# Patient Record
Sex: MALE | Race: BLACK OR AFRICAN AMERICAN | NOT HISPANIC OR LATINO | Employment: FULL TIME | URBAN - METROPOLITAN AREA
[De-identification: names, ages, dates, MRNs, and addresses within clinical notes are randomized per-mention and may not be internally consistent; named-entity substitution may affect disease eponyms.]

---

## 2018-04-11 ENCOUNTER — TELEPHONE (OUTPATIENT)
Dept: NEUROLOGY | Facility: CLINIC | Age: 28
End: 2018-04-11

## 2018-04-11 NOTE — TELEPHONE ENCOUNTER
----- Message from Leon Aj sent at 4/11/2018 11:37 AM CDT -----  Contact: Self @ [phone]  Pt is asking we send a medical records request to [facility], for appt on 04/16. [facility] fax to [phone].

## 2019-06-18 ENCOUNTER — HOSPITAL ENCOUNTER (EMERGENCY)
Facility: HOSPITAL | Age: 29
Discharge: HOME | End: 2019-06-18
Attending: EMERGENCY MEDICINE

## 2019-06-18 VITALS
WEIGHT: 150 LBS | SYSTOLIC BLOOD PRESSURE: 174 MMHG | HEART RATE: 88 BPM | OXYGEN SATURATION: 99 % | DIASTOLIC BLOOD PRESSURE: 85 MMHG | RESPIRATION RATE: 18 BRPM | HEIGHT: 67 IN | BODY MASS INDEX: 23.54 KG/M2 | TEMPERATURE: 99 F

## 2019-06-18 DIAGNOSIS — L02.416 ABSCESS OF LEFT THIGH: Primary | ICD-10-CM

## 2019-06-18 DIAGNOSIS — L02.429 BOIL, THIGH: ICD-10-CM

## 2019-06-18 PROCEDURE — 10060 I&D ABSCESS SIMPLE/SINGLE: CPT

## 2019-06-18 PROCEDURE — 10060 I&D ABSCESS SIMPLE/SINGLE: CPT | Mod: LT,,, | Performed by: EMERGENCY MEDICINE

## 2019-06-18 PROCEDURE — 10060 PR DRAIN SKIN ABSCESS SIMPLE: ICD-10-PCS | Mod: LT,,, | Performed by: EMERGENCY MEDICINE

## 2019-06-18 PROCEDURE — 99284 EMERGENCY DEPT VISIT MOD MDM: CPT | Mod: 25,,, | Performed by: EMERGENCY MEDICINE

## 2019-06-18 PROCEDURE — 25000003 PHARM REV CODE 250: Performed by: EMERGENCY MEDICINE

## 2019-06-18 PROCEDURE — 99284 PR EMERGENCY DEPT VISIT,LEVEL IV: ICD-10-PCS | Mod: 25,,, | Performed by: EMERGENCY MEDICINE

## 2019-06-18 PROCEDURE — 99283 EMERGENCY DEPT VISIT LOW MDM: CPT | Mod: 25

## 2019-06-18 RX ORDER — LIDOCAINE HYDROCHLORIDE 10 MG/ML
5 INJECTION, SOLUTION EPIDURAL; INFILTRATION; INTRACAUDAL; PERINEURAL
Status: COMPLETED | OUTPATIENT
Start: 2019-06-18 | End: 2019-06-18

## 2019-06-18 RX ORDER — SULFAMETHOXAZOLE AND TRIMETHOPRIM 800; 160 MG/1; MG/1
1 TABLET ORAL 2 TIMES DAILY
Qty: 14 TABLET | Refills: 0 | Status: SHIPPED | OUTPATIENT
Start: 2019-06-18 | End: 2019-06-25

## 2019-06-18 RX ORDER — DIVALPROEX SODIUM 125 MG/1
125 CAPSULE, COATED PELLETS ORAL 2 TIMES DAILY
COMMUNITY
End: 2020-02-06 | Stop reason: CLARIF

## 2019-06-18 RX ORDER — IBUPROFEN 600 MG/1
600 TABLET ORAL
Status: COMPLETED | OUTPATIENT
Start: 2019-06-18 | End: 2019-06-18

## 2019-06-18 RX ADMIN — IBUPROFEN 600 MG: 600 TABLET, FILM COATED ORAL at 09:06

## 2019-06-18 RX ADMIN — LIDOCAINE HYDROCHLORIDE 50 MG: 10 INJECTION, SOLUTION EPIDURAL; INFILTRATION; INTRACAUDAL; PERINEURAL at 09:06

## 2019-06-18 NOTE — ED NOTES
Patient identifiers verified and correct for Marcellus Goodwin  LOC: The patient is awake, alert and aware of environment with an appropriate affect, the patient is oriented x 3 and speaking appropriately.   APPEARANCE: Patient appears comfortable and in no acute distress, patient is clean and well groomed.  SKIN: The skin is warm and dry, color consistent with ethnicity, patient has normal skin turgor and moist mucus membranes, skin intact, no breakdown or bruising noted. Abscess to L upper leg  MUSCULOSKELETAL: Patient moving all extremities spontaneously, no swelling noted.   RESPIRATORY: Airway is open and patent, respirations are spontaneous, patient has a normal effort and rate, no accessory muscle use noted, O2 sats noted at 99% on room air.  CARDIAC: Denies chest pain capillary refill < 3 seconds.   GASTRO: Soft and non tender to palpation, no distention noted, normoactive bowel sounds present in all four quadrants. Pt states bowel movements have been regular.  : Pt denies any pain or frequency with urination.  NEURO: Pt opens eyes spontaneously, behavior appropriate to situation, follows commands, facial expression symmetrical, bilateral hand grasp equal and even, purposeful motor response noted, normal sensation in all extremities when touched with a finger.

## 2019-06-18 NOTE — ED PROVIDER NOTES
Encounter Date: 6/18/2019    SCRIBE #1 NOTE: I, Yue Durbin, am scribing for, and in the presence of,  Dr. Cline. I have scribed the following portions of the note - Other sections scribed: HPI, ROS, PE.       History     Chief Complaint   Patient presents with    Abscess     L upper thigh, pain going from L leg down    Leg Pain     Time patient was seen by the provider: 9:11 AM      The patient is a 28 y.o. male with history of smoking who presents to the ED with a complaint of abscess. Patient reports he woke up with an abscess on his left inner thigh 4 days ago and states since then it has been draining. He endorses pain radiating from the area of the abscess down to his left knee. He denies fever or chills. Patient has no additional complaints at this time.  Onset has been gradual, associated with abscess collection, with small amount of drainage with fluctuance and surrounding pain. He has surrounding cellulitis as well. Denies any other constitutional symptoms, nausea, vomiting, fever, groin pain, back pain, neck pain. Current pain scale 10/10 located to the direct abscess site.    The history is provided by the patient and medical records.     Review of patient's allergies indicates:  No Known Allergies  Past Medical History:   Diagnosis Date    Generalized headaches     Seizures      History reviewed. No pertinent surgical history.  Family History   Problem Relation Age of Onset    Cancer Father         colon cancer    Alcohol abuse Father     Cancer Maternal Aunt     Cancer Paternal Aunt     Arthritis Maternal Grandmother     Vision loss Maternal Grandfather      Social History     Tobacco Use    Smoking status: Current Every Day Smoker     Types: Cigarettes   Substance Use Topics    Alcohol use: No    Drug use: Yes     Types: Marijuana     Review of Systems   Constitutional: Negative for chills and fever.   HENT: Negative for sore throat.    Respiratory: Negative for shortness of breath.     Cardiovascular: Negative for chest pain.   Gastrointestinal: Negative for nausea.   Genitourinary: Negative for dysuria.   Musculoskeletal: Negative for back pain.   Skin: Positive for wound (abscess to left inner thigh). Negative for rash.   Neurological: Negative for weakness.   Hematological: Does not bruise/bleed easily.       Physical Exam     Initial Vitals [06/18/19 0856]   BP Pulse Resp Temp SpO2   (!) 174/85 88 18 98.6 °F (37 °C) 99 %      MAP       --         Physical Exam    Nursing note and vitals reviewed.    Gen/Constitutional: Interactive. No acute distress  Head: Normocephalic, Atraumatic  Neck: supple, no masses or LAD  Eyes: PERRLA, conjunctiva clear  Ears, Nose and Throat: No rhinorrhea or stridor.  Cardiac: Reg Rhythm, No murmur  Pulmonary: CTA Bilat, no wheezes, rhonchi, rales.  GI: Abdomen soft, non-tender, non-distended; no rebound or guarding  : No CVA tenderness.  Musculoskeletal: Extremities warm, well perfused, no erythema, no edema  Skin: No rashes, left inner thigh has a 3 x 4 cm area of induration, fluctuance and surrounding cellulitis.  It appears to be open at the top with small amount of purulent discharge.  Neuro: Alert and Oriented x 3; No focal motor or sensory deficits.    Psych: Normal affect     ED Course   I & D - Incision and Drainage  Date/Time: 6/18/2019 9:26 AM  Performed by: Karlo Cline DO  Authorized by: Karlo Cline DO   Consent Done: Not Needed  Type: abscess  Body area: lower extremity  Location details: left leg  Anesthesia: local infiltration    Anesthesia:  Local Anesthetic: lidocaine 1% without epinephrine  Anesthetic total: 3 mL  Scalpel size: 11  Incision type: single straight  Complexity: simple  Drainage: pus  Drainage amount: moderate  Wound treatment: wound packed and  wound left open  Packing material: 1/2 in gauze  Patient tolerance: Patient tolerated the procedure well with no immediate complications        Labs Reviewed - No data to  display       Imaging Results    None          Medical Decision Making:   History:   Old Medical Records: I decided to obtain old medical records.  Initial Assessment:   28 y.o. male with history of smoking presenting with abscess of left thigh.  Differential Diagnosis:   Differential diagnosis includes but is not limited to:  Abscess, cellulitis, necrotizing fasciitis, deep space infection.  Independently Interpreted Test(s):   I have ordered and independently interpreted X-rays - see summary below.       <> Summary of X-Ray Reading(s): Bedside ultrasound    Emergent evaluation of patient presenting with a boil/abscess to the left inner thigh.  He is afebrile, vital signs are stable. Physical exam findings remarkable for a 3 x 4 cm area of induration associated with fluctuance, surrounding cellulitis and a small opening with purulent discharge. Bedside ultrasound confirms a fluid/abscess pocket.  Per procedure note above, the wound was opened up completely with a 1 cm stab incision to allow purulent drainage. The area was packed with iodoform packing, with sterile dressing placed.  Given surrounding cellulitis, placed on short course of antibiotics with plans to follow up with PCP as needed.  Wound care instructions given, strict ED precautions return instructions discussed at length.  Do not suspect necrotizing fasciitis, abscess, deep infection or groin infection. Patient agreeable to discharge plan. Strict ED precautions and return instructions discussed at length and patient verbalized understanding. All questions were answered and ample time was given for questions.      Complexity:  High - procedural          Scribe Attestation:   Scribe #1: I performed the above scribed service and the documentation accurately describes the services I performed. I attest to the accuracy of the note.    I, Dr. Karlo Cline, personally performed the services described in this documentation. All medical record entries made  by the scribe were at my direction and in my presence.  I have reviewed the chart and agree that the record reflects my personal performance and is accurate and complete.              Clinical Impression:       ICD-10-CM ICD-9-CM   1. Abscess of left thigh L02.416 682.6   2. Boil, thigh L02.439 680.6         Disposition:   Disposition: Discharged  Condition: Stable         Karlo Cline DO  Dept of Emergency Medicine   [facility]  Spectralink: [id number]                Karlo Cline DO  06/18/19 1201

## 2019-06-18 NOTE — ED TRIAGE NOTES
Patient reports to the ED today with reports on an abscess to L upper leg onset 4 days ago. Patient states that his R leg hurts all the way down to his foot

## 2019-06-18 NOTE — DISCHARGE INSTRUCTIONS
Take antibiotics as prescribed.     Leave packing in for 24-36 hr, you may remove and then wash and shower daily and keep covered.  Avoid pools, lakes, baths until completely healed.  Follow-up with a primary care as needed.  You may establish care with a primary care doctor at [facility].

## 2019-07-15 ENCOUNTER — HOSPITAL ENCOUNTER (EMERGENCY)
Facility: HOSPITAL | Age: 29
Discharge: HOME | End: 2019-07-15
Attending: EMERGENCY MEDICINE

## 2019-07-15 VITALS
RESPIRATION RATE: 20 BRPM | HEIGHT: 66 IN | WEIGHT: 160 LBS | HEART RATE: 71 BPM | BODY MASS INDEX: 25.71 KG/M2 | DIASTOLIC BLOOD PRESSURE: 67 MMHG | TEMPERATURE: 99 F | SYSTOLIC BLOOD PRESSURE: 125 MMHG | OXYGEN SATURATION: 96 %

## 2019-07-15 DIAGNOSIS — I51.7 LEFT VENTRICULAR HYPERTROPHY BY ELECTROCARDIOGRAM: ICD-10-CM

## 2019-07-15 DIAGNOSIS — R56.9 SEIZURE: ICD-10-CM

## 2019-07-15 DIAGNOSIS — G40.401 OTHER GENERALIZED EPILEPSY, NOT INTRACTABLE, WITH STATUS EPILEPTICUS: Primary | ICD-10-CM

## 2019-07-15 LAB
ALBUMIN SERPL BCP-MCNC: 3.6 G/DL (ref 3.5–5.2)
ALP SERPL-CCNC: 69 U/L (ref 55–135)
ALT SERPL W/O P-5'-P-CCNC: 13 U/L (ref 10–44)
ANION GAP SERPL CALC-SCNC: 9 MMOL/L (ref 8–16)
AST SERPL-CCNC: 16 U/L (ref 10–40)
BASOPHILS # BLD AUTO: 0.03 K/UL (ref 0–0.2)
BASOPHILS NFR BLD: 0.4 % (ref 0–1.9)
BILIRUB SERPL-MCNC: 0.6 MG/DL (ref 0.1–1)
BILIRUB UR QL STRIP: NEGATIVE
BUN SERPL-MCNC: 5 MG/DL (ref 6–20)
CALCIUM SERPL-MCNC: 9.5 MG/DL (ref 8.7–10.5)
CHLORIDE SERPL-SCNC: 107 MMOL/L (ref 95–110)
CLARITY UR REFRACT.AUTO: CLEAR
CO2 SERPL-SCNC: 25 MMOL/L (ref 23–29)
COLOR UR AUTO: YELLOW
CREAT SERPL-MCNC: 0.7 MG/DL (ref 0.5–1.4)
DIFFERENTIAL METHOD: ABNORMAL
EOSINOPHIL # BLD AUTO: 0 K/UL (ref 0–0.5)
EOSINOPHIL NFR BLD: 0.5 % (ref 0–8)
ERYTHROCYTE [DISTWIDTH] IN BLOOD BY AUTOMATED COUNT: 14.2 % (ref 11.5–14.5)
EST. GFR  (AFRICAN AMERICAN): >60 ML/MIN/1.73 M^2
EST. GFR  (NON AFRICAN AMERICAN): >60 ML/MIN/1.73 M^2
GLUCOSE SERPL-MCNC: 81 MG/DL (ref 70–110)
GLUCOSE UR QL STRIP: NEGATIVE
HCT VFR BLD AUTO: 43.7 % (ref 40–54)
HGB BLD-MCNC: 13.7 G/DL (ref 14–18)
HGB UR QL STRIP: NEGATIVE
IMM GRANULOCYTES # BLD AUTO: 0.03 K/UL (ref 0–0.04)
IMM GRANULOCYTES NFR BLD AUTO: 0.4 % (ref 0–0.5)
KETONES UR QL STRIP: ABNORMAL
LEUKOCYTE ESTERASE UR QL STRIP: NEGATIVE
LYMPHOCYTES # BLD AUTO: 2.5 K/UL (ref 1–4.8)
LYMPHOCYTES NFR BLD: 29.8 % (ref 18–48)
MCH RBC QN AUTO: 29.3 PG (ref 27–31)
MCHC RBC AUTO-ENTMCNC: 31.4 G/DL (ref 32–36)
MCV RBC AUTO: 94 FL (ref 82–98)
MONOCYTES # BLD AUTO: 0.5 K/UL (ref 0.3–1)
MONOCYTES NFR BLD: 5.5 % (ref 4–15)
NEUTROPHILS # BLD AUTO: 5.3 K/UL (ref 1.8–7.7)
NEUTROPHILS NFR BLD: 63.4 % (ref 38–73)
NITRITE UR QL STRIP: NEGATIVE
NRBC BLD-RTO: 0 /100 WBC
PH UR STRIP: 7 [PH] (ref 5–8)
PLATELET # BLD AUTO: 199 K/UL (ref 150–350)
PMV BLD AUTO: 10.1 FL (ref 9.2–12.9)
POTASSIUM SERPL-SCNC: 3.7 MMOL/L (ref 3.5–5.1)
PROT SERPL-MCNC: 7.2 G/DL (ref 6–8.4)
PROT UR QL STRIP: NEGATIVE
RBC # BLD AUTO: 4.67 M/UL (ref 4.6–6.2)
SODIUM SERPL-SCNC: 141 MMOL/L (ref 136–145)
SP GR UR STRIP: 1.02 (ref 1–1.03)
URN SPEC COLLECT METH UR: ABNORMAL
VALPROATE SERPL-MCNC: 137.8 UG/ML (ref 50–100)
WBC # BLD AUTO: 8.29 K/UL (ref 3.9–12.7)

## 2019-07-15 PROCEDURE — 99285 PR EMERGENCY DEPT VISIT,LEVEL V: ICD-10-PCS | Mod: ,,, | Performed by: EMERGENCY MEDICINE

## 2019-07-15 PROCEDURE — 99285 EMERGENCY DEPT VISIT HI MDM: CPT | Mod: ,,, | Performed by: EMERGENCY MEDICINE

## 2019-07-15 PROCEDURE — 25000003 PHARM REV CODE 250: Performed by: STUDENT IN AN ORGANIZED HEALTH CARE EDUCATION/TRAINING PROGRAM

## 2019-07-15 PROCEDURE — 80164 ASSAY DIPROPYLACETIC ACD TOT: CPT

## 2019-07-15 PROCEDURE — 85025 COMPLETE CBC W/AUTO DIFF WBC: CPT

## 2019-07-15 PROCEDURE — 99285 EMERGENCY DEPT VISIT HI MDM: CPT

## 2019-07-15 PROCEDURE — 80053 COMPREHEN METABOLIC PANEL: CPT

## 2019-07-15 PROCEDURE — 93005 ELECTROCARDIOGRAM TRACING: CPT

## 2019-07-15 PROCEDURE — 93010 EKG 12-LEAD: ICD-10-PCS | Mod: ,,, | Performed by: INTERNAL MEDICINE

## 2019-07-15 PROCEDURE — 81003 URINALYSIS AUTO W/O SCOPE: CPT

## 2019-07-15 PROCEDURE — 93010 ELECTROCARDIOGRAM REPORT: CPT | Mod: ,,, | Performed by: INTERNAL MEDICINE

## 2019-07-15 RX ORDER — ZONISAMIDE 100 MG/1
300 CAPSULE ORAL ONCE
Status: COMPLETED | OUTPATIENT
Start: 2019-07-15 | End: 2019-07-15

## 2019-07-15 RX ORDER — ALPRAZOLAM 2 MG/1
2 TABLET ORAL NIGHTLY PRN
COMMUNITY
Start: 2017-10-20 | End: 2024-01-25

## 2019-07-15 RX ORDER — ALPRAZOLAM 0.5 MG/1
2 TABLET ORAL
Status: COMPLETED | OUTPATIENT
Start: 2019-07-15 | End: 2019-07-15

## 2019-07-15 RX ORDER — ZONISAMIDE 100 MG/1
300 CAPSULE ORAL NIGHTLY
COMMUNITY
Start: 2017-06-15 | End: 2022-12-12

## 2019-07-15 RX ADMIN — ZONISAMIDE 300 MG: 100 CAPSULE ORAL at 03:07

## 2019-07-15 RX ADMIN — ALPRAZOLAM 2 MG: 0.5 TABLET ORAL at 02:07

## 2019-07-15 NOTE — ED NOTES
Called into patients room, patient states he feels a little shaky and that he feels like he is going to have a panic attack, patient states he is also seeing black spots, patient states having a panic attack sometimes causes a seizure, patient states he takes xanax twice daily for this reason, made physician aware, vss, will continue to monitor

## 2019-07-15 NOTE — ED PROVIDER NOTES
Encounter Date: 7/15/2019       History     Chief Complaint   Patient presents with    Seizures     had one 2 days ago and had one this morning. Reports compliance with depakote. AAOX3     28 y.o. male with PMH of epilepsy presenting with 2 seizures over last 3 days. Pt has had epilepsy since 2011 and had a witnessed seizure 3 days ago while he was sleeping, where he reportedly sat up in bed, biting his tongue, foaming at the mouth, cannot endorse how long it occurred because he lost consciousness during it. Pt endorses having another seizure this AM while he was sleeping similar in presentation to his recent episode. He was concerned since he doesn't have a neurologist here since he moved here from [place] and came into ED. Pt endorses having a few seizures a month while sleeping similar in presentation and denies them getting longer, more intense, or more frequent. Patient denies any head trauma or recent falls. Today he complains of mild photophobia and frontal headache which he endorses usually occur after his seizures. Endorses trouble eating or drinking anything past 2 days due to his tongue pain. He takes Depakote 1000mg BID and Xanax 2mg BID, and endorses medication compliance, but denies taking his medications today. Denies recent illnesses, changes to his diet or medications. Denies fever/chills, chest pain, SOB, N/V, Abd pain, bowel/bladder changes, or numbness in any of his extremities. Last EEG was 2011. Tried Dilantin in the past, but states that it doesn't work. Brought his depakote with him.     Prior L inner thigh abscess has resolved according to pt.           Review of patient's allergies indicates:  No Known Allergies  Past Medical History:   Diagnosis Date    Generalized headaches     Seizures      No past surgical history on file.  Family History   Problem Relation Age of Onset    Cancer Father         colon cancer    Alcohol abuse Father     Cancer Maternal Aunt     Cancer Paternal  Aunt     Arthritis Maternal Grandmother     Vision loss Maternal Grandfather      Social History     Tobacco Use    Smoking status: Current Every Day Smoker     Types: Cigarettes   Substance Use Topics    Alcohol use: No    Drug use: Yes     Types: Marijuana     Review of Systems   Constitutional: Positive for appetite change (2/2 to tongue pain). Negative for chills and fever.   HENT: Negative for congestion, ear pain, mouth sores, sinus pain, sore throat, tinnitus, trouble swallowing and voice change.    Eyes: Positive for photophobia. Negative for pain and visual disturbance.   Respiratory: Negative for cough, choking and shortness of breath.    Cardiovascular: Negative for chest pain and palpitations.   Gastrointestinal: Negative for abdominal distention, blood in stool, diarrhea, nausea and vomiting.   Endocrine: Negative for polyuria.   Genitourinary: Negative for dysuria and hematuria.   Musculoskeletal: Negative for arthralgias, back pain, myalgias, neck pain and neck stiffness.   Skin: Negative for color change and rash.   Allergic/Immunologic: Negative for immunocompromised state.   Neurological: Positive for seizures and headaches. Negative for syncope, light-headedness and numbness.   Hematological: Does not bruise/bleed easily.   Psychiatric/Behavioral: Negative for confusion and decreased concentration.        Denies drug or ETOH abuse       Physical Exam     Initial Vitals [07/15/19 1152]   BP Pulse Resp Temp SpO2   (!) 167/89 81 16 98.7 °F (37.1 °C) 99 %      MAP       --         Physical Exam    Nursing note and vitals reviewed.  Constitutional: He appears well-developed and well-nourished. He is not diaphoretic. No distress.   HENT:   Head: Normocephalic and atraumatic.   Nose: Nose normal.   Mouth/Throat: Oropharynx is clear and moist.   Tongue with swelling on R. Without active bleeding, dried blood, or ecchymosis. Able to stick out tongue and elevate to roof of mouth with mild pain.     Eyes: Conjunctivae and EOM are normal.   Pupil reactivity difficult to obtain due to photophobia  Pupils are equal and round.    Neck: Normal range of motion. Neck supple.   Cardiovascular: Normal rate, regular rhythm, normal heart sounds and intact distal pulses.   Pulmonary/Chest: Breath sounds normal.   Abdominal: Soft. Bowel sounds are normal.   Musculoskeletal: Normal range of motion.   Healed wound abscess over L inner thigh without drainage, or tenderness.   Neurological: He is alert and oriented to person, place, and time. No cranial nerve deficit or sensory deficit.   Skin: Skin is warm. Capillary refill takes less than 2 seconds.   Psychiatric: He has a normal mood and affect. Thought content normal.         ED Course   Procedures  Labs Reviewed   URINALYSIS, REFLEX TO URINE CULTURE - Abnormal; Notable for the following components:       Result Value    Ketones, UA Trace (*)     All other components within normal limits    Narrative:     Preferred Collection Type->Urine, Clean Catch   CBC W/ AUTO DIFFERENTIAL - Abnormal; Notable for the following components:    Hemoglobin 13.7 (*)     Mean Corpuscular Hemoglobin Conc 31.4 (*)     All other components within normal limits   COMPREHENSIVE METABOLIC PANEL - Abnormal; Notable for the following components:    BUN, Bld 5 (*)     All other components within normal limits   VALPROIC ACID - Abnormal; Notable for the following components:    Valproic Acid Level 137.8 (*)     All other components within normal limits        ECG Results          EKG 12-lead (Final result)  Result time 07/16/19 02:41:51    Final result by Interface, Lab In [facility] (07/16/19 02:41:51)                 Narrative:    Test Reason : R56.9,    Vent. Rate : 080 BPM     Atrial Rate : 080 BPM     P-R Int : 132 ms          QRS Dur : 104 ms      QT Int : 362 ms       P-R-T Axes : 073 080 064 degrees     QTc Int : 417 ms    Normal sinus rhythm  Voltage criteria for left ventricular  hypertrophy  Abnormal ECG  No previous ECGs available  Confirmed by Francois MEAD, Wan ([id number]) on 7/16/2019 2:22:28 AM    Referred By: [email or web address]   SELF           Confirmed By:Wan Parrish MD                            Imaging Results          X-Ray Chest 1 View (Final result)  Result time 07/15/19 14:11:29    Final result by Jose Paz MD (07/15/19 14:11:29)                 Impression:      No significant intrathoracic abnormality.      Electronically signed by: Jose Paz MD  Date:    07/15/2019  Time:    14:11             Narrative:    EXAMINATION:  XR CHEST 1 VIEW    COMPARISON:  No prior chest radiographs are currently available for comparison purposes.    FINDINGS:  Heart size is normal, as is the appearance of the pulmonary vascularity.  Lung zones are clear, and free of significant airspace consolidation or volume loss, with incidental note made of calcified granulomata in the right lower lung zone.  No pleural fluid.  No abnormal mediastinal widening.  No pneumothorax.                                 Medical Decision Making:   History:   Old Medical Records: I decided to obtain old medical records.  Initial Assessment:   28 y.o. male with PMH of epilepsy presents w 2 seizures over past 3 days. My DDx includes but is not limited to epilepsy, MI, medication noncompliance, metabolic abnormality. Will obtain serum labs, UA, EKG, CXR. Will administer home depakote, xanax, and zonogren. EKG shows LVH. Serum labs unremarkable. Clinical presentation seems very consistent with epilepsy. Pt responded well to medications given to him in the ER and has not had any seizures since presenting. Neurology and internal medicine referrals were placed. Pt agrees with plan to establish care with neurologist within the next few days and establish care with PCP within the next week. He is safe to discharge to home and continue taking his home medications.    Independently Interpreted Test(s):   I have ordered and independently  interpreted X-rays - see summary below.       <> Summary of X-Ray Reading(s): No focal consolidation or effusion  I have ordered and independently interpreted EKG Reading(s) - see prior notes  Clinical Tests:   Lab Tests: Ordered and Reviewed  Radiological Study: Ordered and Reviewed  Medical Tests: Ordered and Reviewed       APC / Resident Notes:   2:04 PM  Serum labs, UA, depakote level, EKG, and CXR ordered. Will talk to Neurology to get recs on depakote loading.   2:15 PM  CXR negative  2:19 PM  EKG shows LV hypertrophy  2:28 PM  Pt endorses having panic attack and divulged that he didn't take any of his home meds today. Ordered his home 2mg Xanax, 300mg Zonegran, and told him to take his home Depakote since he brought the bottle with him.   2:54 PM  CBC and UA wnl.   4:32 PM  Pt endorses still feeling well.   Decision was not made to talk to neurology because patient responded so well to his treatment and Depakote level is therapeutic.   Updated him that his labs were normal and that he has LV hypertrophy. He endorses never having a hx of this in the past. Counseled him to f/u with a PCP and was made aware that an order for that was placed to follow him for his LVH. Also made him aware of how urgent it is that he needs to schedule an appointment with a neurologist to establish care for his seizures and information for that Will not give any outpatient prescriptions due to pt endorsing still has some left. Pt verbally acknowledged and agrees with plan and is safe to discharge to home.   Return to work note given to pt.          Attending Attestation:   Physician Attestation Statement for Resident:  As the supervising MD   Physician Attestation Statement: I have personally seen and examined this patient.   I agree with the above history. -: 28M with PMH epilepsy presenting with one seizure while sleeping with tongue lac several nights ago, then additional seizure this morning. Depakote 1000 BID. Reports seziures  "occurs a few times every month. With report of breakthrough seizures despite being on Depakote and Zomeg, attempting to neurology transfer care from [place] to [place].      As the supervising MD I agree with the above PE.   -:  NAD, NCAT, A&Ox3,  no photophobia on my exam and PERRL and EOMI,  tongue lac on right side with milld swelling but no active bleeding, no incontinence, no midline CTL TTP, no evidence of skin or MSK trauma, neck supple/normal ROM, CTAB, RRR no mrg, normal extremity ROM/m/s, abd s/nt/nd, no LE edema, skin dry and warm   As the supervising MD I agree with the above treatment, course, plan, and disposition.   -: No indication for head CT as pt has h/o epilepsy d/o, lab w/u unremarkable.    Neurology service was pages multiple times to task for follow up without response. Given pt's continued well appearance without seizure recurrence, pt stable for outpt f/u with ambulatory referral to Neurology.    Patient reports patient taking 2 mg Xanax b.i.d. for several years for "seizures ", however per review of [place]  patient has not been prescribed Xanax 2 mg for 2 years, last prescription October 2017 in [place].  Patient may be obtaining Xanax illicitly. Will not give Xanax prescription, however will refer to Neurology for prompt follow up. Pt informed of LVH per EKG, recommended to avoid any ETOH or drugs, NO DRIVING OR SWIMMING UNTIL CLEARED BY NEUROLOGY (pt states that he doesn't drive regardless).   I have reviewed and agree with the residents interpretation of the following: lab data.                       Clinical Impression:       ICD-10-CM ICD-9-CM   1. Other generalized epilepsy, not intractable, with status epilepticus G40.401 345.90     345.3   2. Seizure R56.9 780.39   3. Left ventricular hypertrophy by electrocardiogram I51.7 429.3                                Jose Celeste MD  Resident  07/15/19 1658       Jose Celeste MD  Resident  07/15/19 1700       Jose Celeste, " MD  Resident  07/15/19 1701       Khloe Simmons MD  07/21/19 3101

## 2019-07-15 NOTE — ED NOTES
LOC: The patient is awake, alert and aware of environment   APPEARANCE: Patient resting comfortably and in no acute distress, patient is clean and well groomed, patient's clothing is properly fastened.  SKIN: The skin is warm and dry, color consistent with ethnicity, patient has normal skin turgor and moist mucus membranes, skin intact, no breakdown or bruising noted.  MUSCULOSKELETAL: Patient moving all extremities spontaneously, no obvious swelling or deformities noted.  RESPIRATORY: Airway is open and patent, respirations are spontaneous, patient has a normal effort and rate, no accessory muscle use noted  ABDOMEN: Soft and non tender to palpation, no distention noted  NEUROLOGIC:  facial expression is symmetrical, patient moving all extremities spontaneously, normal sensation in all extremities when touched with a finger.  Follows all commands appropriately.    Patient brought here by his girlfriend, patient states he had a seizure and does not recall, patient states he has had seizures in the past but does not recall what caused it, patient denies any alcohol or drug use, no acute distress noted, patient states he feels a little out of it but nothing is wrong at this time, cardiac monitoring in progress, will continue to monitor

## 2019-07-15 NOTE — DISCHARGE INSTRUCTIONS
Please call the number for internal medicine as soon as possible to establish care with them and they can follow you for you left ventricular hypertrophy. Please call the number for neurology to establish care and optimize seizure medications. Continue taking your home medications as prescribed.      Please come back to the ER if you are having worsening seizures, temperatures >100.4, chest pain, shortness of breath, or intractable vomiting.

## 2019-10-17 ENCOUNTER — HOSPITAL ENCOUNTER (EMERGENCY)
Facility: HOSPITAL | Age: 29
Discharge: HOME | End: 2019-10-17
Attending: EMERGENCY MEDICINE

## 2019-10-17 VITALS
BODY MASS INDEX: 21.69 KG/M2 | HEIGHT: 66 IN | OXYGEN SATURATION: 98 % | TEMPERATURE: 98 F | SYSTOLIC BLOOD PRESSURE: 141 MMHG | HEART RATE: 66 BPM | DIASTOLIC BLOOD PRESSURE: 85 MMHG | RESPIRATION RATE: 16 BRPM | WEIGHT: 135 LBS

## 2019-10-17 DIAGNOSIS — J02.9 VIRAL PHARYNGITIS: Primary | ICD-10-CM

## 2019-10-17 LAB — DEPRECATED S PYO AG THROAT QL EIA: NEGATIVE

## 2019-10-17 PROCEDURE — 87880 STREP A ASSAY W/OPTIC: CPT

## 2019-10-17 PROCEDURE — 87081 CULTURE SCREEN ONLY: CPT

## 2019-10-17 PROCEDURE — 99284 EMERGENCY DEPT VISIT MOD MDM: CPT | Mod: 25

## 2019-10-17 PROCEDURE — 63600175 PHARM REV CODE 636 W HCPCS: Performed by: PHYSICIAN ASSISTANT

## 2019-10-17 PROCEDURE — 99284 PR EMERGENCY DEPT VISIT,LEVEL IV: ICD-10-PCS | Mod: ,,, | Performed by: PHYSICIAN ASSISTANT

## 2019-10-17 PROCEDURE — 25000003 PHARM REV CODE 250: Performed by: PHYSICIAN ASSISTANT

## 2019-10-17 PROCEDURE — 87147 CULTURE TYPE IMMUNOLOGIC: CPT

## 2019-10-17 PROCEDURE — 99284 EMERGENCY DEPT VISIT MOD MDM: CPT | Mod: ,,, | Performed by: PHYSICIAN ASSISTANT

## 2019-10-17 PROCEDURE — 96372 THER/PROPH/DIAG INJ SC/IM: CPT

## 2019-10-17 RX ORDER — ACETAMINOPHEN 325 MG/1
650 TABLET ORAL
Status: COMPLETED | OUTPATIENT
Start: 2019-10-17 | End: 2019-10-17

## 2019-10-17 RX ORDER — DEXAMETHASONE SODIUM PHOSPHATE 4 MG/ML
8 INJECTION, SOLUTION INTRA-ARTICULAR; INTRALESIONAL; INTRAMUSCULAR; INTRAVENOUS; SOFT TISSUE
Status: DISCONTINUED | OUTPATIENT
Start: 2019-10-17 | End: 2019-10-17

## 2019-10-17 RX ORDER — DEXAMETHASONE SODIUM PHOSPHATE 4 MG/ML
8 INJECTION, SOLUTION INTRA-ARTICULAR; INTRALESIONAL; INTRAMUSCULAR; INTRAVENOUS; SOFT TISSUE
Status: COMPLETED | OUTPATIENT
Start: 2019-10-17 | End: 2019-10-17

## 2019-10-17 RX ADMIN — DEXAMETHASONE SODIUM PHOSPHATE 8 MG: 4 INJECTION, SOLUTION INTRAMUSCULAR; INTRAVENOUS at 08:10

## 2019-10-17 RX ADMIN — ACETAMINOPHEN 650 MG: 325 TABLET ORAL at 08:10

## 2019-10-17 NOTE — ED TRIAGE NOTES
Patient states sore throat x 2 days with chills and difficulty swallowing, dry mouth. No OTC meds today,also states left outer ear pain.   alone

## 2019-10-17 NOTE — ED PROVIDER NOTES
Encounter Date: 10/17/2019       History     Chief Complaint   Patient presents with    Sore Throat     8:35 AM  Patient is a 29-year-old male who presents the ED with sore throat, trouble swallowing, subjective fever and chills, and cough for 3 days.  Endorses dry mouth and left-sided ear pain. Has been taking OTC dayQuil and NyQuil without improvement in his symptoms. Last medication last night.         Review of patient's allergies indicates:  No Known Allergies  Past Medical History:   Diagnosis Date    Generalized headaches     Seizures      History reviewed. No pertinent surgical history.  Family History   Problem Relation Age of Onset    Cancer Father         colon cancer    Alcohol abuse Father     Cancer Maternal Aunt     Cancer Paternal Aunt     Arthritis Maternal Grandmother     Vision loss Maternal Grandfather      Social History     Tobacco Use    Smoking status: Never Smoker    Smokeless tobacco: Never Used   Substance Use Topics    Alcohol use: No    Drug use: Yes     Types: Marijuana     Review of Systems   Constitutional: Negative for chills and fever.   HENT: Positive for ear pain, sore throat and trouble swallowing.    Eyes: Negative for photophobia.   Respiratory: Positive for cough. Negative for shortness of breath.    Cardiovascular: Negative for chest pain.   Gastrointestinal: Negative for abdominal pain and nausea.   Endocrine: Negative for polyphagia.   Genitourinary: Negative for dysuria.   Musculoskeletal: Negative for back pain.   Skin: Negative for rash.   Neurological: Negative for weakness.   Hematological: Does not bruise/bleed easily.       Physical Exam     Initial Vitals [10/17/19 0811]   BP Pulse Resp Temp SpO2   138/76 77 18 98.4 °F (36.9 °C) 98 %      MAP       --         Physical Exam    Vitals reviewed.  Constitutional: He appears well-developed and well-nourished. He is not diaphoretic. No distress.   HENT:   Head: Normocephalic and atraumatic.   Right Ear:  Tympanic membrane, external ear and ear canal normal. Tympanic membrane is not injected.   Left Ear: Tympanic membrane, external ear and ear canal normal. Tympanic membrane is not injected.   Nose: Nose normal.   Mouth/Throat: Uvula is midline and mucous membranes are normal. No trismus in the jaw. Posterior oropharyngeal edema and posterior oropharyngeal erythema present. No oropharyngeal exudate or tonsillar abscesses.   Eyes: Conjunctivae and EOM are normal.   Neck: Normal range of motion.   Cardiovascular: Normal rate.   Pulmonary/Chest: No respiratory distress. He has no wheezes.   Abdominal: He exhibits no distension. There is no tenderness.   Musculoskeletal: Normal range of motion.   Neurological: He is alert and oriented to person, place, and time. He has normal strength. No sensory deficit.   Skin: Skin is warm and dry. No erythema.   Psychiatric: He has a normal mood and affect. Thought content normal.         ED Course   Procedures  Labs Reviewed   THROAT SCREEN, RAPID   CULTURE, STREP A,  THROAT          Imaging Results    None          Medical Decision Making:   History:   Old Medical Records: I decided to obtain old medical records.  Initial Assessment:   Patient is a 29-year-old male who presents the ED with sore throat, trouble swallowing, subjective fever and chills, and cough for 3 days.   Differential Diagnosis:   Includes but is not limited to streptococcal pharyngitis, other viral syndrome, and less likely pneumonia given lungs clear to auscultations and nontoxic appearance.   Clinical Tests:   Lab Tests: Ordered and Reviewed  ED Management:  Will check strep screen, give decadron IM and oral acetaminophen for symptomatic improvement, and reassess.    Rapid strep screen negative.  Strep culture pending.     Will treat for viral pharyngitis.  Patient to continue OTC ibuprofen for pain relief.  I advised cough drops and lozenges.  Rest.  Stay hydrated.  Follow up closely with PCP.  All questions  were answered.  Patient comfortable with plan and stable for discharge.                      Clinical Impression:       ICD-10-CM ICD-9-CM   1. Viral pharyngitis J02.9 462         Disposition:   Disposition: Discharged  Condition: Stable                        Yamileth Barba PA-C  10/17/19 0069

## 2019-10-17 NOTE — DISCHARGE INSTRUCTIONS
You do NOT have strep pharyngitis.  You have a viral pharyngitis. Treat your symptoms.  Continue ibuprofen 600 mg every 6 hours for pain relief. Use cough drops or lozenges.   Call and follow up closely with her primary care physician.    No future appointments.    Our goal in the emergency department is to always give you outstanding care and exceptional service. You may receive a survey by mail or e-mail in the next week regarding your experience in our ED. We would greatly appreciate your completing and returning the survey. Your feedback provides us with a way to recognize our staff who give very good care and it helps us learn how to improve when your experience was below our aspiration of excellence.

## 2019-10-17 NOTE — LETTER
October 19, 2019    Marcellus Goodwin  [address] [place] [id number]                   [address]  [facility] [id number]  Phone: [phone]  Fax: [phone]   Dear Mr. Marcellus Goodwin:    Multiple attempts have been made to contact you at phone number provided regarding new results from your recent ED visit.  Please call us back immediately at [phone] and ask to speak to a PA.       Sincerely,        Dawn Abel PA-C

## 2019-10-17 NOTE — ED NOTES
Patient identifiers verified and correct for Mr Goodwin  C/C: Sore throat SEE NN  APPEARANCE: awake and alert in NAD.  SKIN: warm, dry and intact. No breakdown or bruising.  MUSCULOSKELETAL: Patient moving all extremities spontaneously, no obvious swelling or deformities noted. Ambulates independently.  RESPIRATORY: Denies shortness of breath.Respirations unlabored. Positive chills,  CARDIAC: Denies CP, 2+ distal pulses; no peripheral edema  ABDOMEN: S/ND/NT, Denies nausea  : voids spontaneously, denies difficulty  Neurologic: AAO x 4; follows commands equal strength in all extremities; denies numbness/tingling. Denies dizziness Positive sore thraot, difficulty swallowing, left ear soreness, ayaan tonsil swelling and overall redness

## 2019-10-18 LAB — BACTERIA THROAT CULT: ABNORMAL

## 2019-11-27 ENCOUNTER — HOSPITAL ENCOUNTER (EMERGENCY)
Facility: HOSPITAL | Age: 29
Discharge: HOME | End: 2019-11-27
Attending: EMERGENCY MEDICINE

## 2019-11-27 VITALS
BODY MASS INDEX: 24.3 KG/M2 | RESPIRATION RATE: 16 BRPM | TEMPERATURE: 98 F | DIASTOLIC BLOOD PRESSURE: 74 MMHG | HEART RATE: 88 BPM | HEIGHT: 70 IN | OXYGEN SATURATION: 95 % | SYSTOLIC BLOOD PRESSURE: 141 MMHG | WEIGHT: 169.75 LBS

## 2019-11-27 DIAGNOSIS — R56.9 SEIZURE: Primary | ICD-10-CM

## 2019-11-27 LAB
ANION GAP SERPL CALC-SCNC: 8 MMOL/L (ref 8–16)
BACTERIA #/AREA URNS AUTO: NORMAL /HPF
BASOPHILS # BLD AUTO: 0.04 K/UL (ref 0–0.2)
BASOPHILS NFR BLD: 0.2 % (ref 0–1.9)
BILIRUB UR QL STRIP: NEGATIVE
BUN SERPL-MCNC: 8 MG/DL (ref 6–20)
CALCIUM SERPL-MCNC: 9.6 MG/DL (ref 8.7–10.5)
CHLORIDE SERPL-SCNC: 104 MMOL/L (ref 95–110)
CLARITY UR REFRACT.AUTO: ABNORMAL
CO2 SERPL-SCNC: 28 MMOL/L (ref 23–29)
COLOR UR AUTO: YELLOW
CREAT SERPL-MCNC: 0.9 MG/DL (ref 0.5–1.4)
DIFFERENTIAL METHOD: ABNORMAL
EOSINOPHIL # BLD AUTO: 0.1 K/UL (ref 0–0.5)
EOSINOPHIL NFR BLD: 0.4 % (ref 0–8)
ERYTHROCYTE [DISTWIDTH] IN BLOOD BY AUTOMATED COUNT: 14.6 % (ref 11.5–14.5)
EST. GFR  (AFRICAN AMERICAN): >60 ML/MIN/1.73 M^2
EST. GFR  (NON AFRICAN AMERICAN): >60 ML/MIN/1.73 M^2
GLUCOSE SERPL-MCNC: 85 MG/DL (ref 70–110)
GLUCOSE UR QL STRIP: NEGATIVE
HCT VFR BLD AUTO: 45.2 % (ref 40–54)
HGB BLD-MCNC: 14.1 G/DL (ref 14–18)
HGB UR QL STRIP: NEGATIVE
HYALINE CASTS UR QL AUTO: 0 /LPF
IMM GRANULOCYTES # BLD AUTO: 0.08 K/UL (ref 0–0.04)
IMM GRANULOCYTES NFR BLD AUTO: 0.5 % (ref 0–0.5)
KETONES UR QL STRIP: NEGATIVE
LEUKOCYTE ESTERASE UR QL STRIP: NEGATIVE
LYMPHOCYTES # BLD AUTO: 2 K/UL (ref 1–4.8)
LYMPHOCYTES NFR BLD: 11.8 % (ref 18–48)
MCH RBC QN AUTO: 29.4 PG (ref 27–31)
MCHC RBC AUTO-ENTMCNC: 31.2 G/DL (ref 32–36)
MCV RBC AUTO: 94 FL (ref 82–98)
MICROSCOPIC COMMENT: NORMAL
MONOCYTES # BLD AUTO: 1.2 K/UL (ref 0.3–1)
MONOCYTES NFR BLD: 7.1 % (ref 4–15)
NEUTROPHILS # BLD AUTO: 13.5 K/UL (ref 1.8–7.7)
NEUTROPHILS NFR BLD: 80 % (ref 38–73)
NITRITE UR QL STRIP: NEGATIVE
NRBC BLD-RTO: 0 /100 WBC
PH UR STRIP: 6 [PH] (ref 5–8)
PLATELET # BLD AUTO: 374 K/UL (ref 150–350)
PMV BLD AUTO: 9.9 FL (ref 9.2–12.9)
POTASSIUM SERPL-SCNC: 3.8 MMOL/L (ref 3.5–5.1)
PROT UR QL STRIP: ABNORMAL
RBC # BLD AUTO: 4.8 M/UL (ref 4.6–6.2)
RBC #/AREA URNS AUTO: 0 /HPF (ref 0–4)
SODIUM SERPL-SCNC: 140 MMOL/L (ref 136–145)
SP GR UR STRIP: 1.01 (ref 1–1.03)
URN SPEC COLLECT METH UR: ABNORMAL
VALPROATE SERPL-MCNC: <12.5 UG/ML (ref 50–100)
WBC # BLD AUTO: 16.87 K/UL (ref 3.9–12.7)
WBC #/AREA URNS AUTO: 2 /HPF (ref 0–5)

## 2019-11-27 PROCEDURE — 99283 EMERGENCY DEPT VISIT LOW MDM: CPT

## 2019-11-27 PROCEDURE — 99284 PR EMERGENCY DEPT VISIT,LEVEL IV: ICD-10-PCS | Mod: ,,, | Performed by: EMERGENCY MEDICINE

## 2019-11-27 PROCEDURE — 25000003 PHARM REV CODE 250: Performed by: EMERGENCY MEDICINE

## 2019-11-27 PROCEDURE — 81001 URINALYSIS AUTO W/SCOPE: CPT

## 2019-11-27 PROCEDURE — 80164 ASSAY DIPROPYLACETIC ACD TOT: CPT

## 2019-11-27 PROCEDURE — 85025 COMPLETE CBC W/AUTO DIFF WBC: CPT

## 2019-11-27 PROCEDURE — 80048 BASIC METABOLIC PNL TOTAL CA: CPT

## 2019-11-27 PROCEDURE — 99284 EMERGENCY DEPT VISIT MOD MDM: CPT | Mod: ,,, | Performed by: EMERGENCY MEDICINE

## 2019-11-27 RX ORDER — DIVALPROEX SODIUM 250 MG/1
250 TABLET, DELAYED RELEASE ORAL
Status: COMPLETED | OUTPATIENT
Start: 2019-11-27 | End: 2019-11-27

## 2019-11-27 RX ADMIN — DIVALPROEX SODIUM 250 MG: 250 TABLET, DELAYED RELEASE ORAL at 06:11

## 2019-11-27 NOTE — ED NOTES
"..  Patient identifiers for Marcellus Goodwin 29 y.o. male checked and correct.  Chief Complaint   Patient presents with    Seizures     EMS reports that patient was driving to  "seizure medication" when he had a seizure. Witness per wife in car, wife reports seizure last 5-6 minutes. Patient wife reports this is the second seizure patient had today. Patient was able to pull over before seizure started.     Past Medical History:   Diagnosis Date    Generalized headaches     Seizures      Allergies reported: Review of patient's allergies indicates:  No Known Allergies      LOC: Patient is awake, alert, and aware of environment with an appropriate affect. Patient is oriented x 3 and speaking appropriately.  APPEARANCE: Patient resting comfortably and in no acute distress. Patient is clean and well groomed, patient's clothing is properly fastened.  HEENT: **  SKIN: The skin is warm and dry. Patient has normal skin turgor and moist mucus membranes. Skin is intact; no bruising or breakdown noted.  MUSKULOSKELETAL: Patient is moving all extremities well, no obvious deformities noted. Pulses intact.   RESPIRATORY: Airway is open and patent. Respirations are spontaneous and non-labored with normal effort and rate, BBS=clear  CARDIAC: Patient has a normal rate and rhythm. ** on cardiac monitor,No peripheral edema noted.   ABDOMEN: No distention noted. Bowel sounds active in all 4 quadrants. Soft and non-tender upon palpation.  NEUROLOGICAL: pupils **mm, PERRL. Facial expression is symmetrical. Hand grasps are equal bilaterally. Normal sensation in all extremities when touched with finger.          "

## 2019-11-27 NOTE — ED PROVIDER NOTES
"Encounter Date: 11/27/2019       History     Chief Complaint   Patient presents with    Seizures     EMS reports that patient was driving to  "seizure medication" when he had a seizure. Witness per wife in car, wife reports seizure last 5-6 minutes. Patient wife reports this is the second seizure patient had today. Patient was able to pull over before seizure started.     Marcellus Goodwin is a 29 y.o. male who  has a past medical history of Generalized headaches and Seizures , on Depakote, presenting with 2 seizures in past 24 hr.  On exam patient intermittently answering questions is somnolent likely postictal.  Patient mentions he has had 2 seizures compliant with Depakote medications.  Denies any history of recent chest pain, fevers, nausea, vomiting, trauma, change in medications.  No new phone numbness tingling or weakness. Mentions having seizures few times per month.  Denies any urinary incontinence or tongue biting.          Review of patient's allergies indicates:  No Known Allergies  Past Medical History:   Diagnosis Date    Generalized headaches     Seizures      No past surgical history on file.  Family History   Problem Relation Age of Onset    Cancer Father         colon cancer    Alcohol abuse Father     Cancer Maternal Aunt     Cancer Paternal Aunt     Arthritis Maternal Grandmother     Vision loss Maternal Grandfather      Social History     Tobacco Use    Smoking status: Never Smoker    Smokeless tobacco: Never Used   Substance Use Topics    Alcohol use: No    Drug use: Yes     Types: Marijuana     Review of Systems   Constitutional: Negative for fever.   HENT: Negative for sore throat.    Respiratory: Negative for shortness of breath.    Cardiovascular: Negative for chest pain.   Gastrointestinal: Negative for nausea.   Genitourinary: Negative for dysuria.   Musculoskeletal: Negative for back pain.   Skin: Negative for rash.   Neurological: Negative for weakness.   Hematological: " Does not bruise/bleed easily.       Physical Exam     Initial Vitals [11/27/19 1556]   BP Pulse Resp Temp SpO2   122/84 78 16 98.4 °F (36.9 °C) 99 %      MAP       --         Physical Exam    Nursing note and vitals reviewed.  Constitutional: He appears well-developed and well-nourished.   Somnolent arousable to verbal stimuli   HENT:   Head: Normocephalic and atraumatic.   Eyes: Conjunctivae and EOM are normal. Pupils are equal, round, and reactive to light.   Neck: Normal range of motion. Neck supple.   Cardiovascular: Normal rate and regular rhythm.   Pulmonary/Chest: Breath sounds normal.   Abdominal: Soft. Bowel sounds are normal. There is no tenderness.   Musculoskeletal: Normal range of motion.   Neurological: He is alert and oriented to person, place, and time. He has normal strength. No cranial nerve deficit or sensory deficit. GCS score is 15. GCS eye subscore is 4. GCS verbal subscore is 5. GCS motor subscore is 6.   Skin: Skin is warm and dry.         ED Course   Procedures  Labs Reviewed   CBC W/ AUTO DIFFERENTIAL   COMPREHENSIVE METABOLIC PANEL   VALPROIC ACID   URINALYSIS, REFLEX TO URINE CULTURE          Imaging Results    None          Medical Decision Making:   History:   Old Medical Records: I decided to obtain old medical records.  Initial Assessment:   29-year-old history of seizures on Depakote medication compliant presenting with 2 seizures in the past 24 hr. VSSWNL.  T patient is somnolent otherwise neurological exam is unremarkable.  Differential Diagnosis:   DX includes med noncompliance, subtherapeutic medication, electrolyte derangement, UTI, breakthrough seizures  Clinical Tests:   Lab Tests: Ordered and Reviewed  ED Management:  Plan:  Obtain CBC, CMP, difficult levels, urine and reassess.                   ED Course as of Nov 27 2130 Wed Nov 27, 2019 1948 CMP did not result.  Will order BmP.  after further speaking with patient he is now more awake more alert mentioned CC missed  multiple doses of his anti seizure medication given his normal dose of divalproex will continue to reassess.    [DC]   2040 Noted CBC BMP in urine negative seizure secondary likely to med noncompliance patient said he has not seen a neurologist as instructed.  Reiterated with patient with stressed to see a neurologist and to remain compliant with anti seizure medication patient expresses understanding is will DC home patient has no further questions.    [DC]      ED Course User Index  [DC] Otis Orellana Jr., MD                Clinical Impression:       ICD-10-CM ICD-9-CM   1. Seizure R56.9 780.39                             Otis Orellana Jr., MD  11/27/19 2136

## 2019-11-28 NOTE — ED NOTES
Pt care assumed. Report received by DA Parrish. Pt lying in stretcher in low and locked position and side rails raised x2. Call light, pt's belongings, and bedside table within pt's reach. Pt on continuous cardiac monitoring, pulse oximetry, and BP cycling every 30 minutes. Pt in NAD and verbalized no needs at this time.

## 2020-02-06 ENCOUNTER — HOSPITAL ENCOUNTER (EMERGENCY)
Facility: HOSPITAL | Age: 30
Discharge: HOME | End: 2020-02-06
Attending: EMERGENCY MEDICINE

## 2020-02-06 VITALS
HEART RATE: 78 BPM | RESPIRATION RATE: 19 BRPM | TEMPERATURE: 98 F | BODY MASS INDEX: 22.5 KG/M2 | OXYGEN SATURATION: 100 % | WEIGHT: 140 LBS | HEIGHT: 66 IN | DIASTOLIC BLOOD PRESSURE: 105 MMHG | SYSTOLIC BLOOD PRESSURE: 162 MMHG

## 2020-02-06 DIAGNOSIS — G40.909 SEIZURE DISORDER: Primary | ICD-10-CM

## 2020-02-06 LAB
ALBUMIN SERPL BCP-MCNC: 3.5 G/DL (ref 3.5–5.2)
ALP SERPL-CCNC: 71 U/L (ref 55–135)
ALT SERPL W/O P-5'-P-CCNC: 15 U/L (ref 10–44)
ANION GAP SERPL CALC-SCNC: 8 MMOL/L (ref 8–16)
AST SERPL-CCNC: 20 U/L (ref 10–40)
BASOPHILS # BLD AUTO: 0.03 K/UL (ref 0–0.2)
BASOPHILS NFR BLD: 0.4 % (ref 0–1.9)
BILIRUB SERPL-MCNC: 0.2 MG/DL (ref 0.1–1)
BUN SERPL-MCNC: 7 MG/DL (ref 6–20)
CALCIUM SERPL-MCNC: 9.1 MG/DL (ref 8.7–10.5)
CHLORIDE SERPL-SCNC: 107 MMOL/L (ref 95–110)
CO2 SERPL-SCNC: 27 MMOL/L (ref 23–29)
CREAT SERPL-MCNC: 0.8 MG/DL (ref 0.5–1.4)
DIFFERENTIAL METHOD: ABNORMAL
EOSINOPHIL # BLD AUTO: 0.2 K/UL (ref 0–0.5)
EOSINOPHIL NFR BLD: 2.4 % (ref 0–8)
ERYTHROCYTE [DISTWIDTH] IN BLOOD BY AUTOMATED COUNT: 13.2 % (ref 11.5–14.5)
EST. GFR  (AFRICAN AMERICAN): >60 ML/MIN/1.73 M^2
EST. GFR  (NON AFRICAN AMERICAN): >60 ML/MIN/1.73 M^2
GLUCOSE SERPL-MCNC: 78 MG/DL (ref 70–110)
HCT VFR BLD AUTO: 46.7 % (ref 40–54)
HGB BLD-MCNC: 14.3 G/DL (ref 14–18)
IMM GRANULOCYTES # BLD AUTO: 0.02 K/UL (ref 0–0.04)
IMM GRANULOCYTES NFR BLD AUTO: 0.2 % (ref 0–0.5)
LYMPHOCYTES # BLD AUTO: 2.7 K/UL (ref 1–4.8)
LYMPHOCYTES NFR BLD: 32.4 % (ref 18–48)
MCH RBC QN AUTO: 29.1 PG (ref 27–31)
MCHC RBC AUTO-ENTMCNC: 30.6 G/DL (ref 32–36)
MCV RBC AUTO: 95 FL (ref 82–98)
MONOCYTES # BLD AUTO: 0.5 K/UL (ref 0.3–1)
MONOCYTES NFR BLD: 6.2 % (ref 4–15)
NEUTROPHILS # BLD AUTO: 4.8 K/UL (ref 1.8–7.7)
NEUTROPHILS NFR BLD: 58.4 % (ref 38–73)
NRBC BLD-RTO: 0 /100 WBC
PLATELET # BLD AUTO: 226 K/UL (ref 150–350)
PMV BLD AUTO: 9.9 FL (ref 9.2–12.9)
POTASSIUM SERPL-SCNC: 4.1 MMOL/L (ref 3.5–5.1)
PROT SERPL-MCNC: 7.4 G/DL (ref 6–8.4)
RBC # BLD AUTO: 4.92 M/UL (ref 4.6–6.2)
SODIUM SERPL-SCNC: 142 MMOL/L (ref 136–145)
VALPROATE SERPL-MCNC: <12.5 UG/ML (ref 50–100)
WBC # BLD AUTO: 8.24 K/UL (ref 3.9–12.7)

## 2020-02-06 PROCEDURE — 96360 HYDRATION IV INFUSION INIT: CPT

## 2020-02-06 PROCEDURE — 25000003 PHARM REV CODE 250: Performed by: EMERGENCY MEDICINE

## 2020-02-06 PROCEDURE — 99284 EMERGENCY DEPT VISIT MOD MDM: CPT | Mod: ,,, | Performed by: EMERGENCY MEDICINE

## 2020-02-06 PROCEDURE — 85025 COMPLETE CBC W/AUTO DIFF WBC: CPT

## 2020-02-06 PROCEDURE — 63600175 PHARM REV CODE 636 W HCPCS: Performed by: EMERGENCY MEDICINE

## 2020-02-06 PROCEDURE — 99284 EMERGENCY DEPT VISIT MOD MDM: CPT | Mod: 25

## 2020-02-06 PROCEDURE — 80164 ASSAY DIPROPYLACETIC ACD TOT: CPT

## 2020-02-06 PROCEDURE — 80053 COMPREHEN METABOLIC PANEL: CPT

## 2020-02-06 PROCEDURE — 96361 HYDRATE IV INFUSION ADD-ON: CPT

## 2020-02-06 PROCEDURE — 99284 PR EMERGENCY DEPT VISIT,LEVEL IV: ICD-10-PCS | Mod: ,,, | Performed by: EMERGENCY MEDICINE

## 2020-02-06 RX ORDER — ACETAMINOPHEN 500 MG
1000 TABLET ORAL
Status: COMPLETED | OUTPATIENT
Start: 2020-02-06 | End: 2020-02-06

## 2020-02-06 RX ORDER — DIVALPROEX SODIUM 500 MG/1
1000 TABLET, DELAYED RELEASE ORAL EVERY 12 HOURS
COMMUNITY
End: 2020-12-02

## 2020-02-06 RX ORDER — DIVALPROEX SODIUM 250 MG/1
1000 TABLET, DELAYED RELEASE ORAL
Status: COMPLETED | OUTPATIENT
Start: 2020-02-06 | End: 2020-02-06

## 2020-02-06 RX ADMIN — DIVALPROEX SODIUM 1000 MG: 250 TABLET, DELAYED RELEASE ORAL at 04:02

## 2020-02-06 RX ADMIN — ACETAMINOPHEN 1000 MG: 500 TABLET ORAL at 02:02

## 2020-02-06 RX ADMIN — SODIUM CHLORIDE 1000 ML: 0.9 INJECTION, SOLUTION INTRAVENOUS at 02:02

## 2020-02-06 NOTE — ED PROVIDER NOTES
"Encounter Date: 2/6/2020       History     Chief Complaint   Patient presents with    Seizures     Pt reports seizure this am.  Hx seizures-reports compliance with meds.     29 y.o. M with history of seizure disorder presenting with seizures. Patient states that his wife saw him have 3 seizures while he was asleep today. They occurred within the last hour PTA. He is unsure how long they lasted for. States that since waking up he has had a right sided headache, pain/swelling to right side of his tongue where he thinks he bit it during the seizure. He also still "feels foggy". He denies any other complaints, has not had any other recent symptoms prior to today. States he is on depakote 1000mg BID, reports compliance. Denies any drug or alcohol use. Recently moved back from [place], doesn't have a neurologist here.         Review of patient's allergies indicates:  No Known Allergies  Past Medical History:   Diagnosis Date    Generalized headaches     Seizures      No past surgical history on file.  Family History   Problem Relation Age of Onset    Cancer Father         colon cancer    Alcohol abuse Father     Cancer Maternal Aunt     Cancer Paternal Aunt     Arthritis Maternal Grandmother     Vision loss Maternal Grandfather      Social History     Tobacco Use    Smoking status: Never Smoker    Smokeless tobacco: Never Used   Substance Use Topics    Alcohol use: No    Drug use: Not Currently     Types: Marijuana     Review of Systems   Constitutional: Negative for fever.   HENT: Negative for sore throat.    Respiratory: Negative for shortness of breath.    Cardiovascular: Negative for chest pain.   Gastrointestinal: Negative for nausea.   Genitourinary: Negative for dysuria.   Musculoskeletal: Negative for back pain.   Skin: Negative for rash.   Neurological: Positive for seizures and headaches. Negative for weakness and numbness.   Hematological: Does not bruise/bleed easily.   Psychiatric/Behavioral: " Negative for suicidal ideas.       Physical Exam     Initial Vitals [02/06/20 1420]   BP Pulse Resp Temp SpO2   (!) 144/93 96 14 98.4 °F (36.9 °C) 100 %      MAP       --         Physical Exam    Nursing note and vitals reviewed.  Constitutional: He appears well-developed and well-nourished.   HENT:   Head: Normocephalic and atraumatic.   Shallow laceration to right side of tongue, hemostatic   Eyes: Conjunctivae and EOM are normal. Pupils are equal, round, and reactive to light.   Neck: Normal range of motion. Neck supple.   Cardiovascular: Normal rate, regular rhythm and normal heart sounds.   Pulmonary/Chest: Breath sounds normal. No respiratory distress. He has no wheezes. He has no rales.   Abdominal: Soft. Bowel sounds are normal. He exhibits no distension. There is no tenderness.   Musculoskeletal: Normal range of motion. He exhibits no edema or tenderness.   Neurological: He is alert and oriented to person, place, and time. He has normal strength and normal reflexes. No cranial nerve deficit or sensory deficit. GCS score is 15. GCS eye subscore is 4. GCS verbal subscore is 5. GCS motor subscore is 6.   Finger to nose intact    Skin: Skin is warm and dry.         ED Course   Procedures  Labs Reviewed   CBC W/ AUTO DIFFERENTIAL - Abnormal; Notable for the following components:       Result Value    Mean Corpuscular Hemoglobin Conc 30.6 (*)     All other components within normal limits   VALPROIC ACID - Abnormal; Notable for the following components:    Valproic Acid Level <12.5 (*)     All other components within normal limits   COMPREHENSIVE METABOLIC PANEL          Imaging Results    None          Medical Decision Making:   Initial Assessment:   29 y.o. M with history of seizure disorder (on depakote) presenting after seizures this morning as detailed per HPI. His vitals are stable. On exam he has no focal neurologic abnormalities.   Differential Diagnosis:   Includes but is not limited to breakthrough  seizure, medication noncompliance, electrolyte abnormality, dehydration, tension vs migraine type headache  Clinical Tests:   Lab Tests: Ordered and Reviewed  Medical Tests: Ordered and Reviewed  ED Management:  Basic labs and depakote level ordered. Will give IV fluids and Tylenol for symptomatic relief and monitor for further seizure activity while in the ED. Final dispo pending, anticipate discharge.   Maude Tapia MD  2/6/20, 2:45PM     HO3 Update  Headache has resolved with fluids and Tylenol. No pertinent abnormalities on labs, Depakote level undetectable. Patient now states he may have missed a few doses. 1000mg home dose depakote given. Will discharge with referral to follow up with neurology here in epilepsy clinic. Seizure precautions reviewed. Patient verbalized understanding of plan and return precautions.   Maude Tapia MD  2/6/20, 4PM              Attending Attestation:   Physician Attestation Statement for Resident:  As the supervising MD   Physician Attestation Statement: I have personally seen and examined this patient.   I agree with the above history. -:   As the supervising MD I agree with the above PE.    As the supervising MD I agree with the above treatment, course, plan, and disposition.            I have reviewed and concur with the resident's history, physical, assessment, and plan.  I have personally interviewed and examined the patient at bedside.      Karlo Cline DO    Dept of Emergency Medicine   [facility]  Spectralink: [id number]                          Clinical Impression:       ICD-10-CM ICD-9-CM   1. Seizure disorder G40.909 345.90         Disposition:   Disposition: Discharged  Condition: Stable                     Maude Tapia MD  Resident  02/06/20 1620       Karlo Cline DO  02/08/20 5912

## 2020-02-06 NOTE — ED NOTES
LOC: The patient is awake, alert, and oriented to place, time, situation. Affect is appropriate.  Speech is appropriate and clear.     APPEARANCE: Patient resting uncomfortably, reporting headache and feeling tired. in no acute distress.  Patient is clean and well groomed.    SKIN: The skin is warm and dry; color consistent with ethnicity.  Patient has normal skin turgor and moist mucus membranes.  Skin intact; no breakdown or bruising noted.     MUSCULOSKELETAL: Patient moving upper and lower extremities without difficulty.  Denies weakness.     RESPIRATORY: Airway is open and patent. Respirations spontaneous, even, easy, and non-labored.  Patient has a normal effort and rate.  No accessory muscle use noted. Denies cough.     CARDIAC:  Normal rhythm and rate noted.  No peripheral edema noted. No complaints of chest pain.      ABDOMEN: Soft and non tender to palpation.  No distention noted.     NEUROLOGIC: Eyes open spontaneously. Headache reported.  Behavior appropriate to situation.  Follows commands; facial expression symmetrical.  Purposeful motor response noted; normal sensation in all extremities.

## 2020-02-06 NOTE — DISCHARGE INSTRUCTIONS
Care in the Emergency Department is not final and it is important to follow up with your neurologist. Referral has been placed today for you to follow up with neurology at [facility]. It is very important that you take all of your epileptic medications as prescribed to avoid further further seizure activity. No driving until seizure free for a minimum of 6 months and until cleared by a neurologist.

## 2020-02-06 NOTE — ED TRIAGE NOTES
To the ED after having 3 seizures this am while sleeping.  Witnessed by his wife.  Bite tongue.  Currently with headache and reporting feeling tired.

## 2020-12-11 ENCOUNTER — TELEPHONE (OUTPATIENT)
Dept: ADMINISTRATIVE | Facility: OTHER | Age: 30
End: 2020-12-11

## 2020-12-14 ENCOUNTER — TELEPHONE (OUTPATIENT)
Dept: ADMINISTRATIVE | Facility: OTHER | Age: 30
End: 2020-12-14

## 2022-12-12 ENCOUNTER — HOSPITAL ENCOUNTER (EMERGENCY)
Facility: HOSPITAL | Age: 32
Discharge: HOME | End: 2022-12-12
Attending: EMERGENCY MEDICINE
Payer: MEDICAID

## 2022-12-12 VITALS
OXYGEN SATURATION: 98 % | HEIGHT: 67 IN | HEART RATE: 85 BPM | SYSTOLIC BLOOD PRESSURE: 116 MMHG | TEMPERATURE: 98 F | DIASTOLIC BLOOD PRESSURE: 67 MMHG | WEIGHT: 135 LBS | BODY MASS INDEX: 21.19 KG/M2 | RESPIRATION RATE: 19 BRPM

## 2022-12-12 DIAGNOSIS — G40.919 BREAKTHROUGH SEIZURE: Primary | ICD-10-CM

## 2022-12-12 LAB
ALBUMIN SERPL BCP-MCNC: 3.7 G/DL (ref 3.5–5.2)
ALP SERPL-CCNC: 74 U/L (ref 55–135)
ALT SERPL W/O P-5'-P-CCNC: 15 U/L (ref 10–44)
ANION GAP SERPL CALC-SCNC: 7 MMOL/L (ref 8–16)
ANISOCYTOSIS BLD QL SMEAR: SLIGHT
AST SERPL-CCNC: 17 U/L (ref 10–40)
BASOPHILS # BLD AUTO: 0.04 K/UL (ref 0–0.2)
BASOPHILS NFR BLD: 0.4 % (ref 0–1.9)
BILIRUB SERPL-MCNC: 0.4 MG/DL (ref 0.1–1)
BUN SERPL-MCNC: 10 MG/DL (ref 6–20)
BURR CELLS BLD QL SMEAR: ABNORMAL
CALCIUM SERPL-MCNC: 8.9 MG/DL (ref 8.7–10.5)
CHLORIDE SERPL-SCNC: 109 MMOL/L (ref 95–110)
CO2 SERPL-SCNC: 24 MMOL/L (ref 23–29)
CREAT SERPL-MCNC: 0.8 MG/DL (ref 0.5–1.4)
DIFFERENTIAL METHOD: ABNORMAL
EOSINOPHIL # BLD AUTO: 0.1 K/UL (ref 0–0.5)
EOSINOPHIL NFR BLD: 0.7 % (ref 0–8)
ERYTHROCYTE [DISTWIDTH] IN BLOOD BY AUTOMATED COUNT: 14.9 % (ref 11.5–14.5)
EST. GFR  (NO RACE VARIABLE): >60 ML/MIN/1.73 M^2
GLUCOSE SERPL-MCNC: 84 MG/DL (ref 70–110)
HCT VFR BLD AUTO: 42.1 % (ref 40–54)
HGB BLD-MCNC: 13.4 G/DL (ref 14–18)
IMM GRANULOCYTES # BLD AUTO: 0.06 K/UL (ref 0–0.04)
IMM GRANULOCYTES NFR BLD AUTO: 0.6 % (ref 0–0.5)
LYMPHOCYTES # BLD AUTO: 1.5 K/UL (ref 1–4.8)
LYMPHOCYTES NFR BLD: 14.6 % (ref 18–48)
MCH RBC QN AUTO: 29.1 PG (ref 27–31)
MCHC RBC AUTO-ENTMCNC: 31.8 G/DL (ref 32–36)
MCV RBC AUTO: 92 FL (ref 82–98)
MONOCYTES # BLD AUTO: 0.5 K/UL (ref 0.3–1)
MONOCYTES NFR BLD: 4.9 % (ref 4–15)
NEUTROPHILS # BLD AUTO: 8.1 K/UL (ref 1.8–7.7)
NEUTROPHILS NFR BLD: 78.8 % (ref 38–73)
NRBC BLD-RTO: 0 /100 WBC
OVALOCYTES BLD QL SMEAR: ABNORMAL
PLATELET # BLD AUTO: 136 K/UL (ref 150–450)
PLATELET BLD QL SMEAR: ABNORMAL
PMV BLD AUTO: 10.2 FL (ref 9.2–12.9)
POIKILOCYTOSIS BLD QL SMEAR: SLIGHT
POTASSIUM SERPL-SCNC: 4.5 MMOL/L (ref 3.5–5.1)
PROT SERPL-MCNC: 7.2 G/DL (ref 6–8.4)
RBC # BLD AUTO: 4.6 M/UL (ref 4.6–6.2)
SODIUM SERPL-SCNC: 140 MMOL/L (ref 136–145)
TARGETS BLD QL SMEAR: ABNORMAL
WBC # BLD AUTO: 10.25 K/UL (ref 3.9–12.7)
WBC TOXIC VACUOLES BLD QL SMEAR: PRESENT

## 2022-12-12 PROCEDURE — 85025 COMPLETE CBC W/AUTO DIFF WBC: CPT | Performed by: EMERGENCY MEDICINE

## 2022-12-12 PROCEDURE — 80053 COMPREHEN METABOLIC PANEL: CPT | Performed by: EMERGENCY MEDICINE

## 2022-12-12 PROCEDURE — 25000003 PHARM REV CODE 250: Performed by: EMERGENCY MEDICINE

## 2022-12-12 PROCEDURE — 99283 EMERGENCY DEPT VISIT LOW MDM: CPT

## 2022-12-12 RX ORDER — DIVALPROEX SODIUM 500 MG/1
1000 TABLET, DELAYED RELEASE ORAL EVERY 12 HOURS
Qty: 120 TABLET | Refills: 1 | Status: SHIPPED | OUTPATIENT
Start: 2022-12-12 | End: 2023-01-11

## 2022-12-12 RX ORDER — DIVALPROEX SODIUM 250 MG/1
1000 TABLET, FILM COATED, EXTENDED RELEASE ORAL
Status: COMPLETED | OUTPATIENT
Start: 2022-12-12 | End: 2022-12-12

## 2022-12-12 RX ADMIN — DIVALPROEX SODIUM 1000 MG: 250 TABLET, EXTENDED RELEASE ORAL at 09:12

## 2022-12-12 NOTE — ED NOTES
Pt presents to ED today witnessed seixzure while at work. Reports out of Depakote x 2 days unable to get to pharm to  rx   C/o pain to tongue, reports biting during seizure

## 2022-12-13 NOTE — ED PROVIDER NOTES
Encounter Date: 12/12/2022       History     Chief Complaint   Patient presents with    Seizures     Patient had a witnessed seziure while at work, co workers aided him down to the ground. Glucose 108. Patient is currently out of depakote and last dose was two days ago.      The patient is a 32-year-old with the below past medical history who presents by ambulance after a witnessed generalized tonic-clonic seizure that occurred while he was at work.  Coworkers identified what was occurring and lowered him to the ground.  He sustained no injuries.  He complains of feeling fatigued.  He denies headache, dizziness, vision changes, somatic pain, chest pain, palpitations, shortness of breath, abdominal pain, nausea, and vomiting.  He ran out of his Depakote two days ago.    The history is provided by the patient and the EMS personnel. No  was used.   Review of patient's allergies indicates:  No Known Allergies  Past Medical History:   Diagnosis Date    Generalized headaches     Seizures      History reviewed. No pertinent surgical history.  Family History   Problem Relation Age of Onset    Cancer Father         colon cancer    Alcohol abuse Father     Cancer Maternal Aunt     Cancer Paternal Aunt     Arthritis Maternal Grandmother     Vision loss Maternal Grandfather      Social History     Tobacco Use    Smoking status: Never    Smokeless tobacco: Never   Substance Use Topics    Alcohol use: No    Drug use: Not Currently     Types: Marijuana     Review of Systems   Constitutional:  Positive for fatigue. Negative for chills and fever.   HENT:  Negative for hearing loss.    Eyes:  Negative for visual disturbance.   Respiratory:  Negative for shortness of breath.    Cardiovascular:  Negative for chest pain and palpitations.   Gastrointestinal:  Negative for abdominal pain, nausea and vomiting.   Genitourinary:  Negative for dysuria.   Musculoskeletal:  Negative for arthralgias and myalgias.    Neurological:  Positive for seizures. Negative for dizziness, weakness, light-headedness, numbness and headaches.     Physical Exam     Initial Vitals   BP Pulse Resp Temp SpO2   12/12/22 0856 12/12/22 0856 12/12/22 0923 12/12/22 0856 12/12/22 0856   131/73 104 20 98.4 °F (36.9 °C) 95 %      MAP       --                Physical Exam    Nursing note and vitals reviewed.  Constitutional: He is not diaphoretic. No distress.   HENT:   Head: Normocephalic and atraumatic.   Mouth/Throat: Oropharynx is clear and moist.   Eyes: Conjunctivae and EOM are normal. Pupils are equal, round, and reactive to light. No scleral icterus. Right eye exhibits no nystagmus. Left eye exhibits no nystagmus.   Neck: No JVD present.   Cardiovascular:  Normal rate, regular rhythm and normal heart sounds.     Exam reveals no gallop and no friction rub.       No murmur heard.  Pulmonary/Chest: Effort normal and breath sounds normal. No stridor. No respiratory distress. He has no decreased breath sounds. He has no wheezes. He has no rhonchi. He has no rales.   Abdominal: Abdomen is soft. He exhibits no distension. There is no abdominal tenderness.     Neurological: He is alert and oriented to person, place, and time. He has normal strength. No cranial nerve deficit or sensory deficit. Coordination and gait normal. GCS score is 15. GCS eye subscore is 4. GCS verbal subscore is 5. GCS motor subscore is 6.   Skin: Skin is warm and dry. No pallor.   Psychiatric: He has a normal mood and affect. His speech is normal and behavior is normal. Judgment and thought content normal. He is not actively hallucinating. Cognition and memory are normal. He is attentive.       ED Course   Procedures  Labs Reviewed   CBC W/ AUTO DIFFERENTIAL - Abnormal; Notable for the following components:       Result Value    Hemoglobin 13.4 (*)     MCHC 31.8 (*)     RDW 14.9 (*)     Platelets 136 (*)     Immature Granulocytes 0.6 (*)     Gran # (ANC) 8.1 (*)     Immature  Grans (Abs) 0.06 (*)     Gran % 78.8 (*)     Lymph % 14.6 (*)     Platelet Estimate Decreased (*)     All other components within normal limits   COMPREHENSIVE METABOLIC PANEL - Abnormal; Notable for the following components:    Anion Gap 7 (*)     All other components within normal limits          Imaging Results    None          Medications   divalproex ER 24 hr tablet 1,000 mg (1,000 mg Oral Given 12/12/22 0951)     Medical Decision Making:   History:   Old Medical Records: I decided to obtain old medical records.  Old Records Summarized: other records.       <> Summary of Records: PMH reviewed as above.  Clinical Tests:   Lab Tests: Ordered and Reviewed    MDM: This was an urgent evaluation.  He was afebrile with normal/stable vital signs.  He had no focal neurological deficits.  His presentation was consistent with breakthrough seizures secondary to running out of his antiepileptic medication.  He was given a dose of his seizure medication.  He was monitored for several hours and had no additional seizures, so he was discharged.                        Clinical Impression:   Final diagnoses:  [G40.919] Breakthrough seizure (Primary)        ED Disposition Condition    Discharge Stable          ED Prescriptions       Medication Sig Dispense Start Date End Date Auth. Provider    divalproex (DEPAKOTE) 500 MG TbEC Take 2 tablets (1,000 mg total) by mouth every 12 (twelve) hours. 120 tablet 12/12/2022 1/11/2023 Rashard Horton III, MD          Follow-up Information       Follow up With Specialties Details Why Contact Info    Your primary care provider   We recommend that you arrange follow up with your primary care provider within the next few days.     ER   Return to this ER or visit any other ER should you have any concerns that you feel need immediate attention.              Rashard Horton III, MD  12/13/22 1124       Rashard Horton III, MD  12/13/22 2234

## 2023-01-23 ENCOUNTER — OCCUPATIONAL HEALTH (OUTPATIENT)
Dept: URGENT CARE | Facility: CLINIC | Age: 33
End: 2023-01-23

## 2023-01-23 DIAGNOSIS — Z02.83 ENCOUNTER FOR DRUG SCREENING: Primary | ICD-10-CM

## 2023-01-23 LAB
BREATH ALCOHOL: 0
CTP QC/QA: YES
POC 10 PANEL DRUG SCREEN: ABNORMAL

## 2023-01-23 PROCEDURE — 80305 DRUG TEST PRSMV DIR OPT OBS: CPT | Mod: S$GLB,,,

## 2023-01-23 PROCEDURE — 80305 POCT RAPID DRUG SCREEN 10 PANEL: ICD-10-PCS | Mod: S$GLB,,,

## 2023-01-23 PROCEDURE — 82075 POCT BREATH ALCOHOL TEST: ICD-10-PCS | Mod: S$GLB,,,

## 2023-01-23 PROCEDURE — 82075 ASSAY OF BREATH ETHANOL: CPT | Mod: S$GLB,,,

## 2023-11-03 ENCOUNTER — OCCUPATIONAL HEALTH (OUTPATIENT)
Dept: URGENT CARE | Facility: CLINIC | Age: 33
End: 2023-11-03
Payer: MEDICAID

## 2023-11-03 DIAGNOSIS — Z02.1 PRE-EMPLOYMENT EXAMINATION: Primary | ICD-10-CM

## 2023-11-03 PROCEDURE — 99499 UNLISTED E&M SERVICE: CPT | Mod: S$GLB,,, | Performed by: NURSE PRACTITIONER

## 2023-11-03 PROCEDURE — 99199 OCC MED MRO FEE: ICD-10-PCS | Mod: S$GLB,,, | Performed by: NURSE PRACTITIONER

## 2023-11-03 PROCEDURE — 99199 UNLISTED SPECIAL SVC PX/RPRT: CPT | Mod: S$GLB,,, | Performed by: NURSE PRACTITIONER

## 2023-11-03 PROCEDURE — 80305 OOH COLLECTION ONLY DRUG SCREEN: ICD-10-PCS | Mod: S$GLB,,, | Performed by: NURSE PRACTITIONER

## 2023-11-03 PROCEDURE — 99499 PHYSICAL, BASIC COMPLEXITY: ICD-10-PCS | Mod: S$GLB,,, | Performed by: NURSE PRACTITIONER

## 2023-11-03 PROCEDURE — 80305 DRUG TEST PRSMV DIR OPT OBS: CPT | Mod: S$GLB,,, | Performed by: NURSE PRACTITIONER

## 2023-11-21 ENCOUNTER — OFFICE VISIT (OUTPATIENT)
Dept: URGENT CARE | Facility: CLINIC | Age: 33
End: 2023-11-21
Payer: MEDICAID

## 2023-11-21 VITALS
OXYGEN SATURATION: 96 % | BODY MASS INDEX: 21.19 KG/M2 | HEART RATE: 74 BPM | RESPIRATION RATE: 18 BRPM | WEIGHT: 135 LBS | HEIGHT: 67 IN | TEMPERATURE: 99 F | SYSTOLIC BLOOD PRESSURE: 154 MMHG | DIASTOLIC BLOOD PRESSURE: 87 MMHG

## 2023-11-21 DIAGNOSIS — K04.7 DENTAL INFECTION: ICD-10-CM

## 2023-11-21 DIAGNOSIS — R51.9 ACUTE NONINTRACTABLE HEADACHE, UNSPECIFIED HEADACHE TYPE: Primary | ICD-10-CM

## 2023-11-21 PROCEDURE — 99213 PR OFFICE/OUTPT VISIT, EST, LEVL III, 20-29 MIN: ICD-10-PCS | Mod: S$GLB,,, | Performed by: NURSE PRACTITIONER

## 2023-11-21 PROCEDURE — 99213 OFFICE O/P EST LOW 20 MIN: CPT | Mod: S$GLB,,, | Performed by: NURSE PRACTITIONER

## 2023-11-21 RX ORDER — NAPROXEN 500 MG/1
500 TABLET ORAL 2 TIMES DAILY WITH MEALS
Qty: 14 TABLET | Refills: 0 | Status: SHIPPED | OUTPATIENT
Start: 2023-11-22 | End: 2023-11-29

## 2023-11-21 RX ORDER — KETOROLAC TROMETHAMINE 30 MG/ML
30 INJECTION, SOLUTION INTRAMUSCULAR; INTRAVENOUS
Status: COMPLETED | OUTPATIENT
Start: 2023-11-21 | End: 2023-11-21

## 2023-11-21 RX ORDER — AMLODIPINE BESYLATE 2.5 MG/1
1 TABLET ORAL DAILY
COMMUNITY
Start: 2023-11-20 | End: 2024-11-19

## 2023-11-21 RX ORDER — AMOXICILLIN AND CLAVULANATE POTASSIUM 875; 125 MG/1; MG/1
1 TABLET, FILM COATED ORAL 2 TIMES DAILY
Qty: 14 TABLET | Refills: 0 | Status: SHIPPED | OUTPATIENT
Start: 2023-11-21 | End: 2024-01-25

## 2023-11-21 RX ADMIN — KETOROLAC TROMETHAMINE 30 MG: 30 INJECTION, SOLUTION INTRAMUSCULAR; INTRAVENOUS at 12:11

## 2023-11-21 NOTE — PROGRESS NOTES
"Subjective:      Patient ID: Marcellus Goodwin III is a 33 y.o. male.    Vitals:  height is 5' 7" (1.702 m) and weight is 61.2 kg (135 lb). His temperature is 98.5 °F (36.9 °C). His blood pressure is 154/87 (abnormal) and his pulse is 74. His respiration is 18 and oxygen saturation is 96%.     Chief Complaint: Migraine        33-year-old male with medical history as listed below presents to clinic for evaluation of headache, and right upper dental pain.  Patient states symptoms started 2 days ago.  He was seen in the emergency department yesterday, evaluated for elevated blood pressure.  States was started on amlodipine.  He reports pain worse when applying pressure to the right side of his face.  He reports taking over-the-counter ibuprofen with minimal relief.  He is awake and alert, answers questions appropriately, no acute distress noted on today's visit.    Migraine   This is a new problem. Episode onset: 2 days. The problem occurs constantly. The problem has been unchanged. The pain is located in the Temporal region. The pain does not radiate. The pain quality is not similar to prior headaches. The quality of the pain is described as stabbing and shooting. The pain is at a severity of 9/10. The pain is severe. Associated symptoms include sinus pressure. Pertinent negatives include no abdominal pain, abnormal behavior, anorexia, back pain, blurred vision, dizziness, drainage, ear pain, eye pain, eye redness, eye watering, facial sweating, fever, hearing loss, insomnia, loss of balance or weakness. The symptoms are aggravated by unknown. He has tried acetaminophen for the symptoms. The treatment provided no relief.   Dental Pain   This is a new problem. Episode onset: 2 days. The problem occurs constantly. The problem has been unchanged. The pain is at a severity of 9/10. The pain is moderate. Associated symptoms include facial pain and sinus pressure. Pertinent negatives include no difficulty swallowing, fever, oral " bleeding or thermal sensitivity. He has tried acetaminophen for the symptoms. The treatment provided no relief.     Constitution: Negative for fever.   HENT:  Positive for dental problem and sinus pressure. Negative for ear pain and hearing loss.    Eyes:  Negative for eye pain, eye redness and blurred vision.   Gastrointestinal:  Negative for abdominal pain.   Musculoskeletal:  Negative for back pain.   Neurological:  Negative for dizziness and loss of balance.   Psychiatric/Behavioral:  The patient does not have insomnia.       Objective:     Physical Exam   Constitutional: He is oriented to person, place, and time. He appears well-developed.  Non-toxic appearance. He does not appear ill.   HENT:   Head: Normocephalic and atraumatic. Head is without abrasion, without contusion and without laceration.   Ears:   Right Ear: External ear normal.   Left Ear: External ear normal.   Nose: Nose normal.   Mouth/Throat: Oropharynx is clear and moist and mucous membranes are normal.       Eyes: Conjunctivae, EOM and lids are normal.   Neck: Trachea normal and phonation normal.   Cardiovascular: Normal rate.   Pulmonary/Chest: Effort normal. No respiratory distress.   Abdominal: Normal appearance.   Musculoskeletal: Normal range of motion.         General: Normal range of motion.   Neurological: He is alert and oriented to person, place, and time.   Skin: Skin is warm, dry, intact and not pale. No abrasion, No burn and No ecchymosis   Psychiatric: His speech is normal and behavior is normal. Mood, judgment and thought content normal.   Nursing note and vitals reviewed.      Assessment:     1. Acute nonintractable headache, unspecified headache type    2. Dental infection        Plan:       Acute nonintractable headache, unspecified headache type  -     ketorolac injection 30 mg  -     naproxen (NAPROSYN) 500 MG tablet; Take 1 tablet (500 mg total) by mouth 2 (two) times daily with meals. for 7 days  Dispense: 14 tablet;  Refill: 0    Dental infection  -     amoxicillin-clavulanate 875-125mg (AUGMENTIN) 875-125 mg per tablet; Take 1 tablet by mouth 2 (two) times daily.  Dispense: 14 tablet; Refill: 0      - Headache could be related to possible dental infection, will cover with antibiotics on today's visit.  Advised follow-up with dentist.  Continue previously prescribed amlodipine for blood pressure.  Schedule appointment with PCP.  Patient verbalized understanding and is in agreement with plan.    Patient Instructions   - Follow up with your PCP or specialty clinic as directed in the next 1-2 weeks if not improved or as needed.  You can call [phone] to schedule an appointment with the appropriate provider.    - Go to the ER or seek medical attention immediately if you develop new or worsening symptoms.    - You must understand that you have received an Urgent Care treatment only and that you may be released before all of your medical problems are known or treated.   - You, the patient, will arrange for follow up care as instructed.   - If your condition worsens or fails to improve we recommend that you receive another evaluation at the ER immediately or contact your PCP to discuss your concerns or return here.     You may take Tylenol (acetaminophen 1000 mg every 6 hours), Motrin (ibuprofen 800 mg every 8 hours), or Orajel as directed on the package for your tooth pain if you are permitted to take these medications. Be sure to follow the package directions for dosing. You must follow up with your dentist for further evaluation and treatment. You must have your tooth (teeth) repaired by a dentist OR THIS WILL RECUR.

## 2023-11-21 NOTE — PATIENT INSTRUCTIONS
- Follow up with your PCP or specialty clinic as directed in the next 1-2 weeks if not improved or as needed.  You can call [phone] to schedule an appointment with the appropriate provider.    - Go to the ER or seek medical attention immediately if you develop new or worsening symptoms.    - You must understand that you have received an Urgent Care treatment only and that you may be released before all of your medical problems are known or treated.   - You, the patient, will arrange for follow up care as instructed.   - If your condition worsens or fails to improve we recommend that you receive another evaluation at the ER immediately or contact your PCP to discuss your concerns or return here.     You may take Tylenol (acetaminophen 1000 mg every 6 hours), Motrin (ibuprofen 800 mg every 8 hours), or Orajel as directed on the package for your tooth pain if you are permitted to take these medications. Be sure to follow the package directions for dosing. You must follow up with your dentist for further evaluation and treatment. You must have your tooth (teeth) repaired by a dentist OR THIS WILL RECUR.

## 2023-11-30 ENCOUNTER — OCCUPATIONAL HEALTH (OUTPATIENT)
Dept: URGENT CARE | Facility: CLINIC | Age: 33
End: 2023-11-30

## 2023-11-30 DIAGNOSIS — Z13.9 ENCOUNTER FOR SCREENING: Primary | ICD-10-CM

## 2023-11-30 PROCEDURE — 99199 UNLISTED SPECIAL SVC PX/RPRT: CPT | Mod: S$GLB,,,

## 2023-11-30 PROCEDURE — 80305 DRUG TEST PRSMV DIR OPT OBS: CPT | Mod: S$GLB,,,

## 2023-11-30 PROCEDURE — 80305 OOH COLLECTION ONLY DRUG SCREEN: ICD-10-PCS | Mod: S$GLB,,,

## 2023-11-30 PROCEDURE — 99199 OCC MED MRO FEE: ICD-10-PCS | Mod: S$GLB,,,

## 2024-01-05 ENCOUNTER — HOSPITAL ENCOUNTER (EMERGENCY)
Facility: HOSPITAL | Age: 34
Discharge: HOME | End: 2024-01-05
Attending: EMERGENCY MEDICINE
Payer: MEDICAID

## 2024-01-05 VITALS
BODY MASS INDEX: 22.71 KG/M2 | SYSTOLIC BLOOD PRESSURE: 110 MMHG | HEART RATE: 97 BPM | DIASTOLIC BLOOD PRESSURE: 84 MMHG | WEIGHT: 145 LBS | TEMPERATURE: 98 F | OXYGEN SATURATION: 99 % | RESPIRATION RATE: 16 BRPM

## 2024-01-05 DIAGNOSIS — R56.9 SEIZURE: Primary | ICD-10-CM

## 2024-01-05 PROCEDURE — 99283 EMERGENCY DEPT VISIT LOW MDM: CPT

## 2024-01-05 PROCEDURE — 25000003 PHARM REV CODE 250: Performed by: EMERGENCY MEDICINE

## 2024-01-05 RX ORDER — LIDOCAINE HYDROCHLORIDE 20 MG/ML
5 SOLUTION OROPHARYNGEAL
Status: COMPLETED | OUTPATIENT
Start: 2024-01-05 | End: 2024-01-05

## 2024-01-05 RX ADMIN — LIDOCAINE HYDROCHLORIDE 5 ML: 20 SOLUTION ORAL; TOPICAL at 02:01

## 2024-01-05 NOTE — ED PROVIDER NOTES
Encounter Date: 1/5/2024       History     Chief Complaint   Patient presents with    Seizures     Arrives via EMS with c/o seizure activity witnessed by co-worker lasted about 3 minutes, hx of seizure on Depakote, backed to baseline      HPI patient is a 33-year-old male with a history of seizure disorder who was brought in by ambulance after he had a witnessed seizure at work with no reported fall and head trauma.  Patient explains that he is missed some doses of his valproic acid recently but notes that he does have a refill and does not need additional medications.  Patient had a seizure a few days prior and sustained as tongue laceration at that time which is healing but is still painful.  Patient denies headache, neck pain, back pain, no upper and lower extremity pain and requests to be discharged as this is not uncommon for him.    Review of patient's allergies indicates:  No Known Allergies  Past Medical History:   Diagnosis Date    Generalized headaches     Seizures      No past surgical history on file.  Family History   Problem Relation Age of Onset    Cancer Father         colon cancer    Alcohol abuse Father     Cancer Maternal Aunt     Cancer Paternal Aunt     Arthritis Maternal Grandmother     Vision loss Maternal Grandfather      Social History     Tobacco Use    Smoking status: Former     Types: Cigarettes    Smokeless tobacco: Never   Substance Use Topics    Alcohol use: No    Drug use: Not Currently     Types: Marijuana       Physical Exam     Initial Vitals [01/05/24 1413]   BP Pulse Resp Temp SpO2   108/84 110 18 98.3 °F (36.8 °C) 99 %      MAP       --         Physical Exam  Physical Exam     Nursing note and vitals reviewed.  Constitutional: Patient appears well-developed and well-nourished. No distress.   HENT:   Head: Normocephalic and atraumatic.   Mouth: Patient with healing laceration to R lateral tongue, no bleeding, no erythema/swelling  Eyes: Conjunctivae and EOM are normal. Pupils are  equal, round, and reactive to light.   Neck: Neck supple.   Normal range of motion.  Cardiovascular: Normal rate, regular rhythm, normal heart sounds and intact distal pulses.   Pulmonary/Chest: Breath sounds normal.   Abdominal: Abdomen is soft. Patient exhibits no distension. There is no abdominal tenderness.   Musculoskeletal:      Cervical back: Normal range of motion and neck supple.   Neurological  CN intact   5/5 BL /BI/TRI/DELT  5/5 BL HF/HAM/QUAD/EHL  No pronator drift.   SILT grossly BL UE/LE     GCS score is 15.    Skin: Skin is warm and dry.  Psych: Normal mood/affect    ED Course   Procedures  Labs Reviewed - No data to display       Imaging Results    None          Medications   LIDOcaine viscous HCl 2% oral solution 5 mL (has no administration in time range)     Medical Decision Making  Risk  Prescription drug management.                     Patient with history of seizure disorder, with recent missed doses of valproic acid per patient.    Given patient with seizure with previously well controlled seizures I did offer the patient EKG, chemistry to evaluate for abnormal rhythm, hyponatremia which patient declined and preferred to be discharged home.    I emphasized with the patient the importance of taking his medications as prescribed to prevent further seizures and patient was treated with oral viscous lidocaine for tongue pain.  No evidence of infection along the tongue and laceration is well healing.    Patient was discharged home with instructions take his medications as prescribed, strict return to ED precautions.              Clinical Impression:  Final diagnoses:  [R56.9] Seizure (Primary)          ED Disposition Condition    Discharge Stable          ED Prescriptions    None       Follow-up Information       Follow up With Specialties Details Why Contact Info    [facility] Emergency Medicine  If symptoms worsen [id number]  [facility]  [id number]  [phone]             Monica Jerry MD  01/08/24 1123

## 2024-01-05 NOTE — ED NOTES
Bed: [facility]  Expected date:   Expected time:   Means of arrival:   Comments:

## 2024-01-05 NOTE — ED NOTES
Patient identifiers for Marcellus Goodwin III 33 y.o. male checked and correct.  Chief Complaint   Patient presents with    Seizures     Arrives via EMS with c/o seizure activity witnessed by co-worker lasted about 3 minutes, hx of seizure on Depakote, backed to baseline      Past Medical History:   Diagnosis Date    Generalized headaches     Seizures      Allergies reported: Review of patient's allergies indicates:  No Known Allergies      LOC: Patient is awake, alert, and aware of environment with an appropriate affect. Patient is oriented x 4 and speaking appropriately.  APPEARANCE: Patient resting comfortably and in no acute distress. Patient is clean and well groomed, patient's clothing is properly fastened.  HEENT: WDL  SKIN: The skin is warm and dry. Patient has normal skin turgor and moist mucus membranes.   MUSKULOSKELETAL: Patient is moving all extremities well, no obvious deformities noted. Pulses intact.   RESPIRATORY: Airway is open and patent. Respirations are spontaneous and non-labored with normal effort and rate.  CARDIAC: Patient has a normal rate and rhythm. 88 on cardiac monitor. No peripheral edema noted.   ABDOMEN: No distention noted. Soft and non-tender upon palpation.  NEUROLOGICAL: pupils 3mm, PERRL. Facial expression is symmetrical. Hand grasps are equal bilaterally. Normal sensation in all extremities when touched with finger.

## 2024-01-16 ENCOUNTER — HOSPITAL ENCOUNTER (EMERGENCY)
Facility: HOSPITAL | Age: 34
Discharge: HOME | End: 2024-01-16
Attending: EMERGENCY MEDICINE
Payer: COMMERCIAL

## 2024-01-16 VITALS
WEIGHT: 145 LBS | TEMPERATURE: 98 F | RESPIRATION RATE: 16 BRPM | HEART RATE: 75 BPM | BODY MASS INDEX: 22.76 KG/M2 | DIASTOLIC BLOOD PRESSURE: 84 MMHG | OXYGEN SATURATION: 100 % | HEIGHT: 67 IN | SYSTOLIC BLOOD PRESSURE: 131 MMHG

## 2024-01-16 DIAGNOSIS — R56.9 SEIZURE: Primary | ICD-10-CM

## 2024-01-16 LAB
BUN SERPL-MCNC: 7 MG/DL (ref 6–30)
CHLORIDE SERPL-SCNC: 106 MMOL/L (ref 95–110)
CREAT SERPL-MCNC: 0.8 MG/DL (ref 0.5–1.4)
GLUCOSE SERPL-MCNC: 84 MG/DL (ref 70–110)
HCT VFR BLD CALC: 48 %PCV (ref 36–54)
POC IONIZED CALCIUM: 1.22 MMOL/L (ref 1.06–1.42)
POC TCO2 (MEASURED): 26 MMOL/L (ref 23–29)
POTASSIUM BLD-SCNC: 4 MMOL/L (ref 3.5–5.1)
SAMPLE: NORMAL
SODIUM BLD-SCNC: 144 MMOL/L (ref 136–145)
VALPROATE SERPL-MCNC: <12.5 UG/ML (ref 50–100)

## 2024-01-16 PROCEDURE — 80164 ASSAY DIPROPYLACETIC ACD TOT: CPT

## 2024-01-16 PROCEDURE — 99284 EMERGENCY DEPT VISIT MOD MDM: CPT | Mod: 25

## 2024-01-16 PROCEDURE — 25000003 PHARM REV CODE 250

## 2024-01-16 PROCEDURE — 80048 BASIC METABOLIC PNL TOTAL CA: CPT

## 2024-01-16 RX ORDER — DIVALPROEX SODIUM 250 MG/1
1000 TABLET, DELAYED RELEASE ORAL
Status: COMPLETED | OUTPATIENT
Start: 2024-01-16 | End: 2024-01-16

## 2024-01-16 RX ADMIN — DIVALPROEX SODIUM 1000 MG: 250 TABLET, DELAYED RELEASE ORAL at 09:01

## 2024-01-16 NOTE — ED PROVIDER NOTES
Encounter Date: 1/16/2024       History     Chief Complaint   Patient presents with    Seizures     2 witnessed seizures at work; reporting compliance with meds. Witness at work states that first seizure lasted approx 4minutes and second was shorter in length but pt stayed confused longer after the second seizure. Pt currently AAOx4, GCS 15.     33-year-old male with a past medical history of seizure disorder on Depakote presents to the ED status post 2 witnessed seizures at work.  Patient's supervisor is at bedside to provide collateral history.  Patient denies any prodromal symptoms prior to the seizure onset.  Supervisor states that the 1st seizure was unwitnessed with a 2nd seizure happened in front of him.  During the 2nd seizure patient was seated and the supervisor helped him to the ground.  Currently the patient denies any symptoms and reports to be at baseline.  Currently denies any chest pain, shortness of breath, abdominal pain, headache, nausea, vomiting, diarrhea.  Patient does report some increased stress in his life recently.  He had another seizure on January 5th and a prior seizure in November.  No other complaints at this time.    The history is provided by the patient and medical records.     Review of patient's allergies indicates:  No Known Allergies  Past Medical History:   Diagnosis Date    Generalized headaches     Seizures      History reviewed. No pertinent surgical history.  Family History   Problem Relation Age of Onset    Cancer Father         colon cancer    Alcohol abuse Father     Cancer Maternal Aunt     Cancer Paternal Aunt     Arthritis Maternal Grandmother     Vision loss Maternal Grandfather      Social History     Tobacco Use    Smoking status: Former     Types: Cigarettes    Smokeless tobacco: Never   Substance Use Topics    Alcohol use: No    Drug use: Not Currently     Types: Marijuana     Review of Systems    Physical Exam     Initial Vitals [01/16/24 0802]   BP Pulse Resp  Temp SpO2   119/68 92 16 97.9 °F (36.6 °C) 99 %      MAP       --         Physical Exam    Nursing note and vitals reviewed.  Constitutional: Vital signs are normal. He appears well-developed and well-nourished. He is not diaphoretic. No distress.   HENT:   Head: Normocephalic and atraumatic.   Right Ear: External ear normal.   Left Ear: External ear normal.   Eyes: EOM are normal. Right eye exhibits no discharge. Left eye exhibits no discharge.   Neck: Trachea normal. Neck supple. No thyroid mass present.   Normal range of motion.  Cardiovascular:  Normal rate, regular rhythm, normal heart sounds and intact distal pulses.     Exam reveals no gallop and no friction rub.       No murmur heard.  Pulmonary/Chest: Breath sounds normal. No respiratory distress. He has no wheezes. He has no rhonchi. He has no rales.   Abdominal: Abdomen is soft. Bowel sounds are normal. He exhibits no distension. There is no abdominal tenderness. There is no rebound and no guarding.   Musculoskeletal:         General: No tenderness or edema. Normal range of motion.      Cervical back: Normal range of motion and neck supple.      Comments: No midline C-spine, T-spine or L-spine tenderness to palpation.     Neurological: He is alert and oriented to person, place, and time. He has normal strength. No cranial nerve deficit or sensory deficit. GCS score is 15. GCS eye subscore is 4. GCS verbal subscore is 5. GCS motor subscore is 6.   5/5 strength in the bilateral upper and lower extremities.  No pronator drift.  No facial droop.  Finger-to-nose testing is normal bilaterally.   Skin: Skin is warm and dry. Capillary refill takes less than 2 seconds. No rash noted.   Psychiatric: He has a normal mood and affect.         ED Course   Procedures  Labs Reviewed   VALPROIC ACID - Abnormal; Notable for the following components:       Result Value    Valproic Acid Level <12.5 (*)     All other components within normal limits    Narrative:     Release  to patient->Immediate   ISTAT PROCEDURE          Imaging Results              CT Head Without Contrast (Final result)  Result time 01/16/24 09:28:27      Final result by Aaron Bear DO (01/16/24 09:28:27)                   Impression:      Mild soft tissue swelling induration overlying the left parietal calvarium suggestive for subcutaneous hematoma without underlying calvarial fracture    No acute intracranial findings as detailed above specifically without evidence for acute intracranial hemorrhage.    Further evaluation as warranted clinically.    Electronically signed by resident: Mary Lozano  Date:    01/16/2024  Time:    09:07    Electronically signed by: Aaron Bear DO  Date:    01/16/2024  Time:    09:28               Narrative:    EXAMINATION:  CT HEAD WITHOUT CONTRAST    CLINICAL HISTORY:  Polytrauma, blunt;    TECHNIQUE:  Low dose axial CT images obtained throughout the head without the use of intravenous contrast.  Axial, sagittal and coronal reconstructions were performed.    COMPARISON:  No relevant priors.    FINDINGS:  No evidence for acute intracranial hemorrhage or sulcal effacement.  Ventricles normal in size without hydrocephalus.  Soft tissue swelling induration overlying the left parietal calvarium suggestive for subcutaneous hematoma without gross underlying calvarial fracture.  Visualized paranasal sinuses and mastoid air cells are clear.                                       Medications   divalproex EC tablet 1,000 mg (1,000 mg Oral Given 1/16/24 0924)     Medical Decision Making  33-year-old male who appears to be in NAD presents to the ED with supervisor at bedside via EMS status post seizures.  ABC's intact.  Initial triage vital signs are within normal limits.    Differential diagnosis includes but is not limited to seizure disorder, intracranial hemorrhage, electrolyte abnormality, anemia    Amount and/or Complexity of Data Reviewed  Labs: ordered. Decision-making details  documented in ED Course.  Radiology: ordered. Decision-making details documented in ED Course.    Risk  Prescription drug management.              Attending Attestation:   Physician Attestation Statement for Resident:  As the supervising MD   Physician Attestation Statement: I have personally seen and examined this patient.   I agree with the above history.  -:   As the supervising MD I agree with the above PE.     As the supervising MD I agree with the above treatment, course, plan, and disposition.                    ED Course as of 01/16/24 1228   Tue Jan 16, 2024   0836 Following initial evaluation I had a conversation with the patient about his disposition.  I explained to him that he is having breakthrough seizures as he states that he has been compliant with his medications.  I stated that him having 3 seizures in a 2 week time.  Is not normal if he has been compliant with the seizures and that he likely needs inpatient evaluation and management of his antiepileptic drugs.  He states that he does not want to stay in the hospital and would rather be discharged.  I stated that this was not preferred but I can provide the patient an outpatient referral to Neurology for close follow-up and management of his AEDs.  He understands and agrees with the plan.  Will order labs and imaging. [BG]   0918 Valproic Acid Lvl(!): <12.5  Likely secondary to noncompliance.  I will give patient a dose of 1000 mg of Depakote here in the ED. [BG]   0918 ISTAT PROCEDURE  Grossly unremarkable. [BG]   0931 CT Head Without Contrast  Mild soft tissue swelling induration overlying the left parietal calvarium suggestive for subcutaneous hematoma without underlying calvarial fracture     No acute intracranial findings as detailed above specifically without evidence for acute intracranial hemorrhage.   [BG]   0955 I discussed that his Depakote level is very low in the a likely means that he has not been taking his medications.  He states  that he has been taking his medication.  I gave him 1 dose here in the emergency department I recommended that he take an additional dose this evening.  I recommended no use of machinery or driving until cleared by Neurology.  Ambulatory referral placed to neurology.  Return precautions provided.  All questions answered.  Will discharge the patient. [BG]      ED Course User Index  [BG] Malorie Stratton MD                             Clinical Impression:  Final diagnoses:  [R56.9] Seizure (Primary)          ED Disposition Condition    Discharge Stable          ED Prescriptions    None       Follow-up Information       Follow up With Specialties Details Why Contact Info Additional Information    [facility] - Emergency Dept Emergency Medicine Go to  If symptoms worsen [address] [facility] [phone]     [facility] - Neurology [facility] Neurology Call   [address] [facility] [phone] [facility] [place] [facility], 7th Floor Please park in [facility] and take Clinic elevator             Malorie Stratton MD  Resident  01/16/24 2820       Lazaro Dial MD  01/16/24 9064

## 2024-01-16 NOTE — ED NOTES
I-STAT Chem-8+ Results:   Value Reference Range   Sodium 144 136-145 mmol/L   Potassium  4.0 3.5-5.1 mmol/L   Chloride 106  mmol/L   Ionized Calcium 1.22 1.06-1.42 mmol/L   CO2 (measured) 26 23-29 mmol/L   Glucose 84  mg/dL   BUN 7 6-30 mg/dL   Creatinine 0.8 0.5-1.4 mg/dL   Hematocrit 48 36-54%

## 2024-01-16 NOTE — ED NOTES
Patient identifiers for Marcellus Goodwin III 33 y.o. male checked and correct.  Chief Complaint   Patient presents with    Seizures     2 witnessed seizures at work; reporting compliance with meds. Witness at work states that first seizure lasted approx 4minutes and second was shorter in length but pt stayed confused longer after the second seizure. Pt currently AAOx4, GCS 15.     Past Medical History:   Diagnosis Date    Generalized headaches     Seizures      Allergies reported: Review of patient's allergies indicates:  No Known Allergies      LOC: Patient is awake, alert, and aware of environment with an appropriate affect. Patient is oriented x 4 and speaking appropriately.  APPEARANCE: Patient resting comfortably and in no acute distress. Patient is clean and well groomed, patient's clothing is properly fastened.  HEENT: - JVD, + midline trach  SKIN: The skin is warm and dry. Patient has normal skin turgor and moist mucus membranes.   MUSKULOSKELETAL: Patient is moving all extremities well, no obvious deformities noted. Pulses intact. PMS x 4  RESPIRATORY: Airway is open and patent. Respirations are spontaneous and non-labored with normal effort and rate.  CARDIAC: Patient has a tachycardic rate of 101 on cardiac monitor. No peripheral edema noted.   ABDOMEN: No distention noted. Soft and non-tender upon palpation.  NEUROLOGICAL: pupils 4 mm, PERRL. Facial expression is symmetrical. Hand grasps are equal bilaterally. Normal sensation in all extremities when touched with finger. Denies vision changes and HA

## 2024-01-16 NOTE — ED TRIAGE NOTES
32 y/o M presents to ER with c/c seizures x 2 earlier today. H/x seizure disorder, endorses med compliance. Denies vision changes, HA, N/V/D, c/p SOB, fevers and chills.

## 2024-01-16 NOTE — DISCHARGE INSTRUCTIONS
Please take your prescribed Depakote twice daily for your seizure disorder.    You should not operate heavy machinery or be driving your vehicle until you are cleared by a neurologist!    Please return to the emergency department if you develop any new or worsening symptoms.  This could include changes in mental status, intractable seizures, fever.    I scheduled you a follow-up appointment with Neurology as an outpatient.  Please follow-up with them to have your medicines managed.

## 2024-01-25 ENCOUNTER — OFFICE VISIT (OUTPATIENT)
Dept: NEUROLOGY | Facility: CLINIC | Age: 34
End: 2024-01-25
Payer: MEDICAID

## 2024-01-25 VITALS
DIASTOLIC BLOOD PRESSURE: 92 MMHG | HEART RATE: 116 BPM | HEIGHT: 67 IN | BODY MASS INDEX: 22.89 KG/M2 | SYSTOLIC BLOOD PRESSURE: 152 MMHG | WEIGHT: 145.81 LBS

## 2024-01-25 DIAGNOSIS — R56.9 SEIZURE: ICD-10-CM

## 2024-01-25 PROCEDURE — 99204 OFFICE O/P NEW MOD 45 MIN: CPT | Mod: S$PBB,,, | Performed by: PSYCHIATRY & NEUROLOGY

## 2024-01-25 PROCEDURE — 3008F BODY MASS INDEX DOCD: CPT | Mod: CPTII,,, | Performed by: PSYCHIATRY & NEUROLOGY

## 2024-01-25 PROCEDURE — 99999 PR PBB SHADOW E&M-EST. PATIENT-LVL III: CPT | Mod: PBBFAC,,, | Performed by: PSYCHIATRY & NEUROLOGY

## 2024-01-25 PROCEDURE — 3080F DIAST BP >= 90 MM HG: CPT | Mod: CPTII,,, | Performed by: PSYCHIATRY & NEUROLOGY

## 2024-01-25 PROCEDURE — 99213 OFFICE O/P EST LOW 20 MIN: CPT | Mod: PBBFAC | Performed by: PSYCHIATRY & NEUROLOGY

## 2024-01-25 PROCEDURE — 3077F SYST BP >= 140 MM HG: CPT | Mod: CPTII,,, | Performed by: PSYCHIATRY & NEUROLOGY

## 2024-01-25 RX ORDER — MIDAZOLAM 5 MG/.1ML
5 SPRAY NASAL ONCE AS NEEDED
Qty: 2 EACH | Refills: 0 | Status: SHIPPED | OUTPATIENT
Start: 2024-01-25
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
## 2024-01-26 ENCOUNTER — HOSPITAL ENCOUNTER (OUTPATIENT)
Dept: NEUROLOGY | Facility: CLINIC | Age: 34
Discharge: HOME | End: 2024-01-26
Payer: MEDICAID

## 2024-01-26 ENCOUNTER — LAB VISIT (OUTPATIENT)
Dept: LAB | Facility: HOSPITAL | Age: 34
End: 2024-01-26
Attending: PSYCHIATRY & NEUROLOGY
Payer: MEDICAID

## 2024-01-26 DIAGNOSIS — R56.9 SEIZURE: ICD-10-CM

## 2024-01-26 LAB
ALBUMIN SERPL BCP-MCNC: 3.7 G/DL (ref 3.5–5.2)
ALP SERPL-CCNC: 72 U/L (ref 55–135)
ALT SERPL W/O P-5'-P-CCNC: 6 U/L (ref 10–44)
ANION GAP SERPL CALC-SCNC: 9 MMOL/L (ref 8–16)
AST SERPL-CCNC: 12 U/L (ref 10–40)
BILIRUB SERPL-MCNC: 0.3 MG/DL (ref 0.1–1)
BUN SERPL-MCNC: 11 MG/DL (ref 6–20)
CALCIUM SERPL-MCNC: 9.4 MG/DL (ref 8.7–10.5)
CHLORIDE SERPL-SCNC: 105 MMOL/L (ref 95–110)
CO2 SERPL-SCNC: 27 MMOL/L (ref 23–29)
CREAT SERPL-MCNC: 0.9 MG/DL (ref 0.5–1.4)
EST. GFR  (NO RACE VARIABLE): >60 ML/MIN/1.73 M^2
GLUCOSE SERPL-MCNC: 94 MG/DL (ref 70–110)
POTASSIUM SERPL-SCNC: 4.2 MMOL/L (ref 3.5–5.1)
PROT SERPL-MCNC: 7.3 G/DL (ref 6–8.4)
SODIUM SERPL-SCNC: 141 MMOL/L (ref 136–145)

## 2024-01-26 PROCEDURE — 36415 COLL VENOUS BLD VENIPUNCTURE: CPT | Performed by: PSYCHIATRY & NEUROLOGY

## 2024-01-26 PROCEDURE — 80053 COMPREHEN METABOLIC PANEL: CPT | Performed by: PSYCHIATRY & NEUROLOGY

## 2024-01-26 PROCEDURE — 95819 EEG AWAKE AND ASLEEP: CPT | Mod: PBBFAC | Performed by: PSYCHIATRY & NEUROLOGY

## 2024-01-30 ENCOUNTER — OCCUPATIONAL HEALTH (OUTPATIENT)
Dept: URGENT CARE | Facility: CLINIC | Age: 34
End: 2024-01-30

## 2024-01-30 VITALS — HEIGHT: 67 IN | WEIGHT: 154 LBS | BODY MASS INDEX: 24.17 KG/M2

## 2024-01-30 DIAGNOSIS — Z76.89 RETURN TO WORK EVALUATION: Primary | ICD-10-CM

## 2024-01-30 PROCEDURE — 99499 UNLISTED E&M SERVICE: CPT | Mod: S$GLB,,, | Performed by: FAMILY MEDICINE

## 2024-02-01 ENCOUNTER — PATIENT MESSAGE (OUTPATIENT)
Dept: NEUROLOGY | Facility: CLINIC | Age: 34
End: 2024-02-01
Payer: MEDICAID

## 2024-02-05 ENCOUNTER — PATIENT MESSAGE (OUTPATIENT)
Dept: NEUROLOGY | Facility: CLINIC | Age: 34
End: 2024-02-05
Payer: MEDICAID

## 2024-02-06 ENCOUNTER — SOCIAL WORK (OUTPATIENT)
Dept: NEUROLOGY | Facility: CLINIC | Age: 34
End: 2024-02-06
Payer: MEDICAID
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
## 2024-02-11 ENCOUNTER — HOSPITAL ENCOUNTER (OUTPATIENT)
Dept: RADIOLOGY | Facility: HOSPITAL | Age: 34
Discharge: HOME | End: 2024-02-11
Attending: PSYCHIATRY & NEUROLOGY
Payer: MEDICAID

## 2024-02-11 DIAGNOSIS — R56.9 SEIZURE: ICD-10-CM

## 2024-02-11 PROCEDURE — 70553 MRI BRAIN STEM W/O & W/DYE: CPT | Mod: 26,,, | Performed by: RADIOLOGY

## 2024-02-11 PROCEDURE — 70553 MRI BRAIN STEM W/O & W/DYE: CPT | Mod: TC

## 2024-02-11 PROCEDURE — 25500020 PHARM REV CODE 255: Performed by: PSYCHIATRY & NEUROLOGY

## 2024-02-11 PROCEDURE — A9585 GADOBUTROL INJECTION: HCPCS | Performed by: PSYCHIATRY & NEUROLOGY

## 2024-02-11 RX ORDER — GADOBUTROL 604.72 MG/ML
7 INJECTION INTRAVENOUS
Status: COMPLETED | OUTPATIENT
Start: 2024-02-11 | End: 2024-02-11

## 2024-02-11 RX ADMIN — GADOBUTROL 7 ML: 604.72 INJECTION INTRAVENOUS at 03:02

## 2024-02-14 ENCOUNTER — TELEPHONE (OUTPATIENT)
Dept: NEUROLOGY | Facility: CLINIC | Age: 34
End: 2024-02-14
Payer: MEDICAID

## 2024-02-14 NOTE — TELEPHONE ENCOUNTER
----- Message from Anuja Mcdonald sent at 2/14/2024 10:17 AM CST -----  Regarding: Original Copy of Paperwork Needed  Contact: [phone]  Pt is calling stating that his jobs needs the original papers he bought in to be filled out. They are not accepting the copy of the forms. Pt would like to  papers today if possible. Please give pt a call to further discuss and to confirm if papers can be picked up today.

## 2024-02-15 ENCOUNTER — TELEPHONE (OUTPATIENT)
Dept: NEUROLOGY | Facility: CLINIC | Age: 34
End: 2024-02-15
Payer: MEDICAID

## 2024-02-15 NOTE — TELEPHONE ENCOUNTER
----- Message from Anuja Mcdonald sent at 2/15/2024 11:58 AM CST -----  Regarding: Advise  Contact: [phone]  Pt wants to know if he is able to bring papers that he needs to be filled out for his job to office today after 3pm when he gets off from work. Please give pt a call to confirm if he is able to do this.

## 2024-02-16 ENCOUNTER — OFFICE VISIT (OUTPATIENT)
Dept: NEUROLOGY | Facility: CLINIC | Age: 34
End: 2024-02-16
Payer: MEDICAID

## 2024-02-16 DIAGNOSIS — R45.4 DIFFICULTY CONTROLLING ANGER: ICD-10-CM

## 2024-02-16 DIAGNOSIS — R56.9 SEIZURE: ICD-10-CM

## 2024-02-16 DIAGNOSIS — F32.A DEPRESSION, UNSPECIFIED DEPRESSION TYPE: Primary | ICD-10-CM

## 2024-02-16 PROCEDURE — 90791 PSYCH DIAGNOSTIC EVALUATION: CPT | Mod: 95,,, | Performed by: SOCIAL WORKER
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
## 2024-02-29 ENCOUNTER — SOCIAL WORK (OUTPATIENT)
Dept: NEUROLOGY | Facility: CLINIC | Age: 34
End: 2024-02-29
Payer: MEDICAID

## 2024-03-04 ENCOUNTER — PATIENT MESSAGE (OUTPATIENT)
Dept: NEUROLOGY | Facility: CLINIC | Age: 34
End: 2024-03-04
Payer: MEDICAID

## 2024-03-07 ENCOUNTER — PATIENT MESSAGE (OUTPATIENT)
Dept: NEUROLOGY | Facility: CLINIC | Age: 34
End: 2024-03-07
Payer: MEDICAID

## 2024-03-20 ENCOUNTER — OFFICE VISIT (OUTPATIENT)
Dept: NEUROLOGY | Facility: CLINIC | Age: 34
End: 2024-03-20
Payer: MEDICAID

## 2024-03-20 DIAGNOSIS — F32.A DEPRESSION, UNSPECIFIED DEPRESSION TYPE: ICD-10-CM

## 2024-03-20 DIAGNOSIS — R45.4 DIFFICULTY CONTROLLING ANGER: ICD-10-CM

## 2024-03-20 DIAGNOSIS — G40.909 SEIZURE DISORDER: Primary | ICD-10-CM

## 2024-03-20 PROCEDURE — 90832 PSYTX W PT 30 MINUTES: CPT | Mod: 95,,, | Performed by: SOCIAL WORKER
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
## 2024-05-09 ENCOUNTER — OCCUPATIONAL HEALTH (OUTPATIENT)
Dept: URGENT CARE | Facility: CLINIC | Age: 34
End: 2024-05-09

## 2024-05-09 DIAGNOSIS — Z13.9 ENCOUNTER FOR SCREENING: Primary | ICD-10-CM

## 2024-05-09 PROCEDURE — 99199 UNLISTED SPECIAL SVC PX/RPRT: CPT | Mod: S$GLB,,,

## 2024-05-09 PROCEDURE — 80305 DRUG TEST PRSMV DIR OPT OBS: CPT | Mod: S$GLB,,,

## 2024-06-19 ENCOUNTER — PATIENT MESSAGE (OUTPATIENT)
Dept: NEUROLOGY | Facility: CLINIC | Age: 34
End: 2024-06-19
Payer: MEDICAID

## 2024-08-26 NOTE — PROCEDURES
DATE: 1/26/24    EEG NUMBER:  OC     REFERRING PHYSICIAN:  Dr. Carter      This EEG was performed to assess for evidence of underlying epilepsy.     ELECTROENCEPHALOGRAM REPORT     METHODOLOGY:  Electroencephalographic (EEG) recording is with electrodes placed according to the International 10-20 placement system.  Thirty two (32) channels of digital signal are simultaneously recorded from the scalp and may include EKG, EMG, and/or eye monitors.   Recording band pass was 0.1 to 512 hz.  Digital video recording of the patient is simultaneously recorded with the EEG.  The staff report clinical symptoms and may press an event button when the patient has symptoms of clinical interest to the treating physicians.  EEG and video recording is stored and archived in digital format.  The entire recording is visually reviewed, and the times identified by computer analysis as being spikes or seizures are reviewed again.  Activation procedures which include photic stimulation, hyperventilation and instructing patients to perform simple task are done in selected patients.   Compresses spectral analysis (CSA) is also performed on the activity recorded from each individual channel.  This is displayed as a power display of frequencies from 0 to 30 Hz over time.   The CSA analysis is done and displayed continuously.  This is reviewed for asymmetries in power between homologous areas of the scalp and for presence of changes in power which can be seen when seizures occur.  Sections of suspected abnormalities on the CSA is then compared with the original EEG recording.                Gradient X software was also utilized in the review of this study.  This software suite analyzes the EEG recording in multiple domains.  Coherence and rhythmicity is computed to identify EEG sections which may contain organized seizures.  Each channel undergoes analysis to detect presence of spike and sharp waves which have special and morphological  characteristic of epileptic activity.  The routine EEG recording is converted from spacial into frequency domain.  This is then displayed comparing homologous areas to identify areas of significant asymmetry.  Algorithm to identify non-cortically generated artifact is used to separate eye movement, EMG and other artifact from the EEG.     EEG FINDINGS:  The recording was obtained with a number of standard bipolar and referential montages during wakefulness, drowsiness and sleep.  In the alert state, the posterior background rhythm was a symmetric, well-modulated 9 to 10 Hz alpha rhythm, which reacted symmetrically to eye opening.  Intermittent photic stimulation evoked symmetric posterior driving responses. Hyperventilation produced physiological slowing.  No abnormalities were activated by photic stimulation or hyperventilation.  During drowsiness, the background rhythm waxed and waned and there were periods of slowing.  During stage II sleep, symmetric V waves and sleep spindles were noted.  There were no focal abnormalities.  There were no interictal epileptiform abnormalities and no clinical or electrographic seizures were recorded.    The EKG channel revealed a sinus rhythm.     IMPRESSION:  This is a normal EEG during wakefulness, drowsiness and sleep.     CLINICAL CORRELATION:  The patient is a  33-year-old male with a history of epilepsy who is currently maintained on Depakote. This is a normal EEG during wakefulness, drowsiness and sleep.  There is no evidence for neither cortical dysfunction nor an epileptic process on this recording.  No seizures were recorded during this study.

## 2024-10-25 ENCOUNTER — HOSPITAL ENCOUNTER (EMERGENCY)
Facility: HOSPITAL | Age: 34
Discharge: HOME | End: 2024-10-25
Attending: FAMILY MEDICINE
Payer: MEDICAID

## 2024-10-25 VITALS
BODY MASS INDEX: 24.48 KG/M2 | TEMPERATURE: 98 F | HEART RATE: 89 BPM | DIASTOLIC BLOOD PRESSURE: 82 MMHG | RESPIRATION RATE: 19 BRPM | OXYGEN SATURATION: 100 % | HEIGHT: 67 IN | SYSTOLIC BLOOD PRESSURE: 164 MMHG | WEIGHT: 156 LBS

## 2024-10-25 DIAGNOSIS — Z87.898 HISTORY OF SEIZURES: ICD-10-CM

## 2024-10-25 DIAGNOSIS — Z76.0 MEDICATION REFILL: Primary | ICD-10-CM

## 2024-10-25 PROCEDURE — 99281 EMR DPT VST MAYX REQ PHY/QHP: CPT | Mod: ER

## 2024-10-25 RX ORDER — DIVALPROEX SODIUM 500 MG/1
1000 TABLET, FILM COATED, EXTENDED RELEASE ORAL 2 TIMES DAILY
Qty: 120 TABLET | Refills: 0 | Status: SHIPPED | OUTPATIENT
Start: 2024-10-25 | End: 2024-11-24

## 2024-11-13 ENCOUNTER — PATIENT MESSAGE (OUTPATIENT)
Dept: RESEARCH | Facility: HOSPITAL | Age: 34
End: 2024-11-13
Payer: MEDICAID

## 2025-06-17 ENCOUNTER — HOSPITAL ENCOUNTER (EMERGENCY)
Facility: HOSPITAL | Age: 35
Discharge: HOME | End: 2025-06-17
Attending: EMERGENCY MEDICINE

## 2025-06-17 VITALS
HEART RATE: 98 BPM | WEIGHT: 143 LBS | DIASTOLIC BLOOD PRESSURE: 105 MMHG | BODY MASS INDEX: 22.98 KG/M2 | RESPIRATION RATE: 17 BRPM | OXYGEN SATURATION: 99 % | TEMPERATURE: 99 F | SYSTOLIC BLOOD PRESSURE: 178 MMHG | HEIGHT: 66 IN
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
## 2025-06-17 DIAGNOSIS — S00.83XA CONTUSION OF FACE, INITIAL ENCOUNTER: ICD-10-CM

## 2025-06-17 DIAGNOSIS — S01.512A INTRAORAL LACERATION, INITIAL ENCOUNTER: ICD-10-CM

## 2025-06-17 PROCEDURE — 99285 EMERGENCY DEPT VISIT HI MDM: CPT | Mod: 25,ER

## 2025-06-17 PROCEDURE — 96372 THER/PROPH/DIAG INJ SC/IM: CPT | Performed by: EMERGENCY MEDICINE

## 2025-06-17 PROCEDURE — 63600175 PHARM REV CODE 636 W HCPCS: Mod: ER | Performed by: EMERGENCY MEDICINE

## 2025-06-17 PROCEDURE — 25000003 PHARM REV CODE 250: Mod: ER | Performed by: EMERGENCY MEDICINE

## 2025-06-17 PROCEDURE — 12011 RPR F/E/E/N/L/M 2.5 CM/<: CPT | Mod: ER

## 2025-06-17 RX ORDER — KETOROLAC TROMETHAMINE 30 MG/ML
15 INJECTION, SOLUTION INTRAMUSCULAR; INTRAVENOUS
Status: COMPLETED | OUTPATIENT
Start: 2025-06-17 | End: 2025-06-17

## 2025-06-17 RX ORDER — DICLOFENAC SODIUM 75 MG/1
75 TABLET, DELAYED RELEASE ORAL 2 TIMES DAILY
Qty: 60 TABLET | Refills: 0 | Status: SHIPPED | OUTPATIENT
Start: 2025-06-17

## 2025-06-17 RX ORDER — AMOXICILLIN AND CLAVULANATE POTASSIUM 875; 125 MG/1; MG/1
1 TABLET, FILM COATED ORAL
Status: COMPLETED | OUTPATIENT
Start: 2025-06-17 | End: 2025-06-17

## 2025-06-17 RX ORDER — ACETAMINOPHEN 500 MG
1000 TABLET ORAL
Status: COMPLETED | OUTPATIENT
Start: 2025-06-17 | End: 2025-06-17

## 2025-06-17 RX ORDER — AMOXICILLIN 500 MG/1
500 CAPSULE ORAL 3 TIMES DAILY
Qty: 21 CAPSULE | Refills: 0 | Status: SHIPPED | OUTPATIENT
Start: 2025-06-17 | End: 2025-06-24

## 2025-06-17 RX ADMIN — KETOROLAC TROMETHAMINE 15 MG: 30 INJECTION, SOLUTION INTRAMUSCULAR; INTRAVENOUS at 07:06

## 2025-06-17 RX ADMIN — AMOXICILLIN AND CLAVULANATE POTASSIUM 1 TABLET: 875; 125 TABLET, FILM COATED ORAL at 07:06

## 2025-06-17 RX ADMIN — LIDOCAINE HYDROCHLORIDE 1 ML: 10; .005 INJECTION, SOLUTION EPIDURAL; INFILTRATION; INTRACAUDAL; PERINEURAL at 07:06

## 2025-06-17 RX ADMIN — ACETAMINOPHEN 1000 MG: 500 TABLET ORAL at 07:06
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
